# Patient Record
Sex: MALE | Race: WHITE | NOT HISPANIC OR LATINO | Employment: OTHER | URBAN - NONMETROPOLITAN AREA
[De-identification: names, ages, dates, MRNs, and addresses within clinical notes are randomized per-mention and may not be internally consistent; named-entity substitution may affect disease eponyms.]

---

## 2019-10-30 ENCOUNTER — TRANSCRIBE ORDERS (OUTPATIENT)
Dept: GENERAL RADIOLOGY | Facility: HOSPITAL | Age: 49
End: 2019-10-30

## 2019-10-30 ENCOUNTER — HOSPITAL ENCOUNTER (OUTPATIENT)
Dept: GENERAL RADIOLOGY | Facility: HOSPITAL | Age: 49
Discharge: HOME | End: 2019-10-30
Admitting: NURSE PRACTITIONER

## 2019-10-30 DIAGNOSIS — M25.562 LEFT KNEE PAIN, UNSPECIFIED CHRONICITY: Primary | ICD-10-CM

## 2019-10-30 DIAGNOSIS — M25.562 LEFT KNEE PAIN, UNSPECIFIED CHRONICITY: ICD-10-CM

## 2019-10-30 PROCEDURE — 73562 X-RAY EXAM OF KNEE 3: CPT

## 2020-03-06 ENCOUNTER — TRANSCRIBE ORDERS (OUTPATIENT)
Dept: ULTRASOUND IMAGING | Facility: HOSPITAL | Age: 50
End: 2020-03-06

## 2020-03-06 ENCOUNTER — HOSPITAL ENCOUNTER (OUTPATIENT)
Dept: ULTRASOUND IMAGING | Facility: HOSPITAL | Age: 50
Discharge: HOME | End: 2020-03-06
Admitting: NURSE PRACTITIONER

## 2020-03-06 DIAGNOSIS — M79.605 LEFT LEG PAIN: Primary | ICD-10-CM

## 2020-03-06 DIAGNOSIS — M79.605 LEFT LEG PAIN: ICD-10-CM

## 2020-03-06 DIAGNOSIS — R60.9 EDEMA, UNSPECIFIED TYPE: ICD-10-CM

## 2020-03-06 PROCEDURE — 93971 EXTREMITY STUDY: CPT

## 2020-05-21 ENCOUNTER — TRANSCRIBE ORDERS (OUTPATIENT)
Dept: ADMINISTRATIVE | Facility: HOSPITAL | Age: 50
End: 2020-05-21

## 2020-05-21 DIAGNOSIS — N18.2 CHRONIC KIDNEY DISEASE, STAGE II (MILD): Primary | ICD-10-CM

## 2020-06-03 ENCOUNTER — APPOINTMENT (OUTPATIENT)
Dept: CT IMAGING | Facility: HOSPITAL | Age: 50
End: 2020-06-03

## 2020-06-03 ENCOUNTER — HOSPITAL ENCOUNTER (EMERGENCY)
Facility: HOSPITAL | Age: 50
Discharge: ANOTHER HOSPITAL | End: 2020-06-04
Attending: STUDENT IN AN ORGANIZED HEALTH CARE EDUCATION/TRAINING PROGRAM | Admitting: STUDENT IN AN ORGANIZED HEALTH CARE EDUCATION/TRAINING PROGRAM

## 2020-06-03 VITALS
OXYGEN SATURATION: 97 % | BODY MASS INDEX: 42.66 KG/M2 | HEART RATE: 74 BPM | HEIGHT: 72 IN | SYSTOLIC BLOOD PRESSURE: 159 MMHG | DIASTOLIC BLOOD PRESSURE: 78 MMHG | WEIGHT: 315 LBS | RESPIRATION RATE: 20 BRPM | TEMPERATURE: 98.3 F

## 2020-06-03 DIAGNOSIS — R29.898 TRANSIENT WEAKNESS OF LOWER EXTREMITY: Primary | ICD-10-CM

## 2020-06-03 DIAGNOSIS — R20.2 PARESTHESIA OF LOWER EXTREMITY: ICD-10-CM

## 2020-06-03 LAB
ALBUMIN SERPL-MCNC: 3.8 G/DL (ref 3.5–5.2)
ALBUMIN/GLOB SERPL: 1.3 G/DL
ALP SERPL-CCNC: 74 U/L (ref 39–117)
ALT SERPL W P-5'-P-CCNC: 29 U/L (ref 1–41)
ANION GAP SERPL CALCULATED.3IONS-SCNC: 13.1 MMOL/L (ref 5–15)
AST SERPL-CCNC: 24 U/L (ref 1–40)
BASOPHILS # BLD AUTO: 0.02 10*3/MM3 (ref 0–0.2)
BASOPHILS NFR BLD AUTO: 0.2 % (ref 0–1.5)
BILIRUB SERPL-MCNC: 0.6 MG/DL (ref 0.2–1.2)
BUN BLD-MCNC: 18 MG/DL (ref 6–20)
BUN/CREAT SERPL: 15.3 (ref 7–25)
CALCIUM SPEC-SCNC: 9.2 MG/DL (ref 8.6–10.5)
CHLORIDE SERPL-SCNC: 96 MMOL/L (ref 98–107)
CO2 SERPL-SCNC: 28.9 MMOL/L (ref 22–29)
CREAT BLD-MCNC: 1.18 MG/DL (ref 0.76–1.27)
DEPRECATED RDW RBC AUTO: 42.3 FL (ref 37–54)
EOSINOPHIL # BLD AUTO: 0.07 10*3/MM3 (ref 0–0.4)
EOSINOPHIL NFR BLD AUTO: 0.8 % (ref 0.3–6.2)
ERYTHROCYTE [DISTWIDTH] IN BLOOD BY AUTOMATED COUNT: 13.6 % (ref 12.3–15.4)
GFR SERPL CREATININE-BSD FRML MDRD: 65 ML/MIN/1.73
GLOBULIN UR ELPH-MCNC: 2.9 GM/DL
GLUCOSE BLD-MCNC: 103 MG/DL (ref 65–99)
HCT VFR BLD AUTO: 41.9 % (ref 37.5–51)
HGB BLD-MCNC: 14 G/DL (ref 13–17.7)
IMM GRANULOCYTES # BLD AUTO: 0.08 10*3/MM3 (ref 0–0.05)
IMM GRANULOCYTES NFR BLD AUTO: 0.9 % (ref 0–0.5)
LYMPHOCYTES # BLD AUTO: 2.27 10*3/MM3 (ref 0.7–3.1)
LYMPHOCYTES NFR BLD AUTO: 25.9 % (ref 19.6–45.3)
MCH RBC QN AUTO: 28.5 PG (ref 26.6–33)
MCHC RBC AUTO-ENTMCNC: 33.4 G/DL (ref 31.5–35.7)
MCV RBC AUTO: 85.3 FL (ref 79–97)
MONOCYTES # BLD AUTO: 0.66 10*3/MM3 (ref 0.1–0.9)
MONOCYTES NFR BLD AUTO: 7.5 % (ref 5–12)
NEUTROPHILS # BLD AUTO: 5.67 10*3/MM3 (ref 1.7–7)
NEUTROPHILS NFR BLD AUTO: 64.7 % (ref 42.7–76)
NRBC BLD AUTO-RTO: 0 /100 WBC (ref 0–0.2)
PLATELET # BLD AUTO: 219 10*3/MM3 (ref 140–450)
PMV BLD AUTO: 10 FL (ref 6–12)
POTASSIUM BLD-SCNC: 3.6 MMOL/L (ref 3.5–5.2)
PROT SERPL-MCNC: 6.7 G/DL (ref 6–8.5)
RBC # BLD AUTO: 4.91 10*6/MM3 (ref 4.14–5.8)
SODIUM BLD-SCNC: 138 MMOL/L (ref 136–145)
WBC NRBC COR # BLD: 8.77 10*3/MM3 (ref 3.4–10.8)

## 2020-06-03 PROCEDURE — 72131 CT LUMBAR SPINE W/O DYE: CPT

## 2020-06-03 PROCEDURE — 99283 EMERGENCY DEPT VISIT LOW MDM: CPT

## 2020-06-03 PROCEDURE — 85025 COMPLETE CBC W/AUTO DIFF WBC: CPT | Performed by: STUDENT IN AN ORGANIZED HEALTH CARE EDUCATION/TRAINING PROGRAM

## 2020-06-03 PROCEDURE — 81003 URINALYSIS AUTO W/O SCOPE: CPT | Performed by: STUDENT IN AN ORGANIZED HEALTH CARE EDUCATION/TRAINING PROGRAM

## 2020-06-03 PROCEDURE — 80053 COMPREHEN METABOLIC PANEL: CPT | Performed by: STUDENT IN AN ORGANIZED HEALTH CARE EDUCATION/TRAINING PROGRAM

## 2020-06-03 PROCEDURE — 80306 DRUG TEST PRSMV INSTRMNT: CPT | Performed by: STUDENT IN AN ORGANIZED HEALTH CARE EDUCATION/TRAINING PROGRAM

## 2020-06-03 RX ORDER — METOPROLOL SUCCINATE 25 MG/1
25 TABLET, EXTENDED RELEASE ORAL DAILY
COMMUNITY

## 2020-06-03 RX ORDER — LISINOPRIL 20 MG/1
20 TABLET ORAL DAILY
COMMUNITY

## 2020-06-03 RX ORDER — PROPAFENONE HYDROCHLORIDE 150 MG/1
150 TABLET, COATED ORAL EVERY 8 HOURS
COMMUNITY

## 2020-06-04 LAB
AMPHET+METHAMPHET UR QL: NEGATIVE
AMPHETAMINES UR QL: NEGATIVE
BARBITURATES UR QL SCN: NEGATIVE
BENZODIAZ UR QL SCN: NEGATIVE
BILIRUB UR QL STRIP: NEGATIVE
BUPRENORPHINE SERPL-MCNC: NEGATIVE NG/ML
CANNABINOIDS SERPL QL: NEGATIVE
CLARITY UR: CLEAR
COCAINE UR QL: NEGATIVE
COLOR UR: YELLOW
GLUCOSE UR STRIP-MCNC: NEGATIVE MG/DL
HGB UR QL STRIP.AUTO: NEGATIVE
KETONES UR QL STRIP: NEGATIVE
LEUKOCYTE ESTERASE UR QL STRIP.AUTO: NEGATIVE
METHADONE UR QL SCN: NEGATIVE
NITRITE UR QL STRIP: NEGATIVE
OPIATES UR QL: NEGATIVE
OXYCODONE UR QL SCN: NEGATIVE
PCP UR QL SCN: NEGATIVE
PH UR STRIP.AUTO: <=5 [PH] (ref 5–8)
PROPOXYPH UR QL: NEGATIVE
PROT UR QL STRIP: NEGATIVE
SP GR UR STRIP: 1.01 (ref 1–1.03)
TRICYCLICS UR QL SCN: NEGATIVE
UROBILINOGEN UR QL STRIP: NORMAL

## 2020-06-04 NOTE — ED PROVIDER NOTES
Subjective   50-year-old male that presents to the emergency department complaining of numbness and tingling to bilateral lower extremities.  States this is a chronic problem he has had for greater than 2 years but today it is worse than normal.  He states that normally when he wakes up in the morning his legs are not as swollen but as the day goes on his legs swell and become more numb.  It is worse in his feet and slowly gets better as it worked its way up to his thighs.  Patient states that he feels weak as well and feels like his legs will give out on him.  He has had no injury.  Primary care provider did set him up to see a neurologist but the appointment is not for several weeks.  Patient denies fever, chills, sweats, headache, vision changes, chest pain, shortness of breath abdominal pain, nausea, vomiting or diarrhea.  He is not hurting anywhere and is not bleeding.          Review of Systems   All other systems reviewed and are negative.      Past Medical History:   Diagnosis Date   • A-fib (CMS/HCC)    • Hypertension        No Known Allergies    Past Surgical History:   Procedure Laterality Date   • TONSILLECTOMY         History reviewed. No pertinent family history.    Social History     Socioeconomic History   • Marital status:      Spouse name: Not on file   • Number of children: Not on file   • Years of education: Not on file   • Highest education level: Not on file   Tobacco Use   • Smoking status: Never Smoker   Substance and Sexual Activity   • Alcohol use: Not Currently   • Drug use: Not Currently           Objective   Physical Exam   Nursing note and vitals reviewed.      GEN: No acute distress  Head: Normocephalic, atraumatic  Eyes: Pupils equal round reactive to light  ENT: Posterior pharynx normal in appearance, oral mucosa is moist  Chest: Nontender to palpation  Cardiovascular: Regular rate  Lungs: Clear to auscultation bilaterally  Abdomen: Soft, nontender, nondistended, no peritoneal  signs  Extremities: No edema, normal appearance  Neuro: GCS 15, patient does have sensation in bilateral lower extremities to sharp, but does state that his feet feel like they are asleep.  As I work my way up his lower extremities checking for sensation he states that it does improve subjectively above his knees.  Patient has a diminished reflex on the left patella.  Right patellar reflex seems to be intact  Psych: Mood and affect are appropriate      Procedures           ED Course  ED Course as of Jun 04 0021 Wed Jun 03, 2020   2334 CODE BLUE was called and CT.  We got over there it was not an actual code Mr. Lopez had lost control of his legs from the waist down.  Fortunately Rashard, our CT tech, was standing there when this occurred and prevented the patient from falling.  He did ease him to the ground where we placed him on a backboard to get him back up on a gurney.    [DT]   2340 Reexamined Mr. Lopez now that he is lying in a bed in his room again.  He does have some strength against gravity when lifting legs upward.  Still reports numbness that has slightly improved.  He reports that when he sat up after getting the CT scan everything went numb.    [DT]      ED Course User Index  [DT] Salomon Wells MD                                           MDM  Number of Diagnoses or Management Options  Diagnosis management comments: We will perform basic screening laboratories.  Will image patient's lumbar spine with a CT to look for any impingement.  Patient states he does not have any back pain currently.      1216 am -    CT Lumbar Spine Without Contrast (Final result)   Result time 06/03/20 23:58:51   Final result by Harshad Saba MD (06/03/20 23:58:51)             Impression:     Multilevel degenerative change without acute bony abnormality.    Authenticated by Harshad Saba III, MD on 06/03/2020  11:58:51 PM        Narrative:     FINAL REPORT    TECHNIQUE:  Axial imaging of the lumbar spine was  obtained without contrast.  Sagittal and coronal reformatted images were also obtained and  reviewed.  This study was performed with techniques to keep  radiation doses as low as reasonably achievable (ALARA).  Individualized dose reduction techniques using automated  exposure control or adjustment of mA and/or kV according to the  patient''s size were employed.    CLINICAL HISTORY:  bilateral lower extremity numbness    FINDINGS:  There is mild chronic wedging of T11.  No acute fracture is  identified.  The vertebral alignment is normal.  Moderate  diffuse degenerative changes are identified with annular bulges  and osteophytes. There is no evidence of significant central  canal stenosis.     L1-L2: An annular bulge is present.  Facet  arthropathy and osteophytes are present.  There is mild left  neural foraminal narrowing.     L2-L3: An annular bulge and  facet arthropathy are present.  There is mild bilateral neural  foraminal narrowing.     L3-L4: An annular bulge is present.  Facet arthropathy and osteophytes are present.  There is  moderate bilateral neural foraminal narrowing.     L4-L5: An  annular bulge and facet arthropathy are present.  There is  severe bilateral neural foraminal narrowing.     L5-S1: An  annular bulge and facet arthropathy are present.  There is  moderate right and mild left neural foraminal narrowing.          I called [facility] and discussed with Dr. Portillo, neurosurgery, who graciously accepted the patient as a transfer.  Patient will need MRI imaging and possible surgery.      Patient could not move his legs or feel numb at all while in CT.  Since returning to his room this has improved.  He does have some movement against gravity bilaterally with the right being stronger than the left.  He still has a diminished patellar reflex on the left.  He has not lost control of bowel or bladder.  He has not had issues urinating and was able to use a urinal while lying in the bed.    35 minutes  critical care time was spent by the attending physician excluding separately billable procedures         Amount and/or Complexity of Data Reviewed  Clinical lab tests: ordered  Tests in the radiology section of CPT®: reviewed  Discussion of test results with the performing providers: yes  Decide to obtain previous medical records or to obtain history from someone other than the patient: yes  Obtain history from someone other than the patient: yes  Review and summarize past medical records: yes  Discuss the patient with other providers: yes  Independent visualization of images, tracings, or specimens: yes        Final diagnoses:   Transient weakness of lower extremity   Paresthesia of lower extremity            Salomon Wells MD  06/04/20 0021

## 2021-09-07 ENCOUNTER — TRANSCRIBE ORDERS (OUTPATIENT)
Dept: LAB | Facility: HOSPITAL | Age: 51
End: 2021-09-07

## 2021-09-07 ENCOUNTER — LAB (OUTPATIENT)
Dept: LAB | Facility: HOSPITAL | Age: 51
End: 2021-09-07

## 2021-09-07 DIAGNOSIS — Z20.822 COVID-19 RULED OUT: Primary | ICD-10-CM

## 2021-09-07 DIAGNOSIS — Z20.822 COVID-19 RULED OUT: ICD-10-CM

## 2021-09-07 PROCEDURE — C9803 HOPD COVID-19 SPEC COLLECT: HCPCS

## 2021-09-07 PROCEDURE — U0004 COV-19 TEST NON-CDC HGH THRU: HCPCS

## 2021-09-08 LAB — SARS-COV-2 RNA NOSE QL NAA+PROBE: DETECTED

## 2021-09-09 ENCOUNTER — TELEPHONE (OUTPATIENT)
Dept: OTHER | Facility: HOSPITAL | Age: 51
End: 2021-09-09

## 2022-08-31 ENCOUNTER — TRANSCRIBE ORDERS (OUTPATIENT)
Dept: ADMINISTRATIVE | Facility: HOSPITAL | Age: 52
End: 2022-08-31

## 2022-08-31 ENCOUNTER — HOSPITAL ENCOUNTER (OUTPATIENT)
Dept: ULTRASOUND IMAGING | Facility: HOSPITAL | Age: 52
Discharge: HOME | End: 2022-08-31
Admitting: NURSE PRACTITIONER

## 2022-08-31 DIAGNOSIS — M79.604 LEG PAIN, RIGHT: ICD-10-CM

## 2022-08-31 DIAGNOSIS — M79.604 LEG PAIN, RIGHT: Primary | ICD-10-CM

## 2022-08-31 PROCEDURE — 93971 EXTREMITY STUDY: CPT

## 2024-01-15 ENCOUNTER — HOSPITAL ENCOUNTER (EMERGENCY)
Facility: HOSPITAL | Age: 54
Discharge: HOME | End: 2024-01-15
Attending: EMERGENCY MEDICINE | Admitting: STUDENT IN AN ORGANIZED HEALTH CARE EDUCATION/TRAINING PROGRAM
Payer: COMMERCIAL

## 2024-01-15 ENCOUNTER — APPOINTMENT (OUTPATIENT)
Dept: GENERAL RADIOLOGY | Facility: HOSPITAL | Age: 54
End: 2024-01-15
Payer: COMMERCIAL

## 2024-01-15 VITALS
RESPIRATION RATE: 18 BRPM | DIASTOLIC BLOOD PRESSURE: 97 MMHG | HEART RATE: 100 BPM | SYSTOLIC BLOOD PRESSURE: 143 MMHG | OXYGEN SATURATION: 94 % | TEMPERATURE: 98.3 F | HEIGHT: 72 IN | BODY MASS INDEX: 40.23 KG/M2 | WEIGHT: 297 LBS

## 2024-01-15 DIAGNOSIS — M25.472 BILATERAL SWELLING OF FEET AND ANKLES: Primary | ICD-10-CM

## 2024-01-15 DIAGNOSIS — M25.471 BILATERAL SWELLING OF FEET AND ANKLES: Primary | ICD-10-CM

## 2024-01-15 DIAGNOSIS — M25.475 BILATERAL SWELLING OF FEET AND ANKLES: Primary | ICD-10-CM

## 2024-01-15 DIAGNOSIS — M25.474 BILATERAL SWELLING OF FEET AND ANKLES: Primary | ICD-10-CM

## 2024-01-15 LAB
ALBUMIN SERPL-MCNC: 3.7 G/DL (ref 3.5–5.2)
ALBUMIN/GLOB SERPL: 1.4 G/DL
ALP SERPL-CCNC: 73 U/L (ref 39–117)
ALT SERPL W P-5'-P-CCNC: 10 U/L (ref 1–41)
ANION GAP SERPL CALCULATED.3IONS-SCNC: 10.3 MMOL/L (ref 5–15)
AST SERPL-CCNC: 12 U/L (ref 1–40)
BASOPHILS # BLD AUTO: 0.02 10*3/MM3 (ref 0–0.2)
BASOPHILS NFR BLD AUTO: 0.2 % (ref 0–1.5)
BILIRUB SERPL-MCNC: 0.7 MG/DL (ref 0–1.2)
BUN SERPL-MCNC: 11 MG/DL (ref 6–20)
BUN/CREAT SERPL: 9 (ref 7–25)
CALCIUM SPEC-SCNC: 8.8 MG/DL (ref 8.6–10.5)
CHLORIDE SERPL-SCNC: 100 MMOL/L (ref 98–107)
CK SERPL-CCNC: 66 U/L (ref 20–200)
CO2 SERPL-SCNC: 31.7 MMOL/L (ref 22–29)
CREAT SERPL-MCNC: 1.22 MG/DL (ref 0.76–1.27)
CRP SERPL-MCNC: 7.27 MG/DL (ref 0–0.5)
DEPRECATED RDW RBC AUTO: 45.3 FL (ref 37–54)
EGFRCR SERPLBLD CKD-EPI 2021: 70 ML/MIN/1.73
EOSINOPHIL # BLD AUTO: 0.1 10*3/MM3 (ref 0–0.4)
EOSINOPHIL NFR BLD AUTO: 1.2 % (ref 0.3–6.2)
ERYTHROCYTE [DISTWIDTH] IN BLOOD BY AUTOMATED COUNT: 15.1 % (ref 12.3–15.4)
GLOBULIN UR ELPH-MCNC: 2.7 GM/DL
GLUCOSE SERPL-MCNC: 116 MG/DL (ref 65–99)
HCT VFR BLD AUTO: 44.2 % (ref 37.5–51)
HGB BLD-MCNC: 14.3 G/DL (ref 13–17.7)
HOLD SPECIMEN: NORMAL
HOLD SPECIMEN: NORMAL
IMM GRANULOCYTES # BLD AUTO: 0.06 10*3/MM3 (ref 0–0.05)
IMM GRANULOCYTES NFR BLD AUTO: 0.7 % (ref 0–0.5)
LYMPHOCYTES # BLD AUTO: 1.52 10*3/MM3 (ref 0.7–3.1)
LYMPHOCYTES NFR BLD AUTO: 17.5 % (ref 19.6–45.3)
MAGNESIUM SERPL-MCNC: 1.9 MG/DL (ref 1.6–2.6)
MCH RBC QN AUTO: 26.6 PG (ref 26.6–33)
MCHC RBC AUTO-ENTMCNC: 32.4 G/DL (ref 31.5–35.7)
MCV RBC AUTO: 82.2 FL (ref 79–97)
MONOCYTES # BLD AUTO: 0.76 10*3/MM3 (ref 0.1–0.9)
MONOCYTES NFR BLD AUTO: 8.7 % (ref 5–12)
MYOGLOBIN SERPL-MCNC: 31.9 NG/ML (ref 28–72)
NEUTROPHILS NFR BLD AUTO: 6.23 10*3/MM3 (ref 1.7–7)
NEUTROPHILS NFR BLD AUTO: 71.7 % (ref 42.7–76)
NRBC BLD AUTO-RTO: 0 /100 WBC (ref 0–0.2)
NT-PROBNP SERPL-MCNC: 1701 PG/ML (ref 0–900)
PLATELET # BLD AUTO: 275 10*3/MM3 (ref 140–450)
PMV BLD AUTO: 9.3 FL (ref 6–12)
POTASSIUM SERPL-SCNC: 4.4 MMOL/L (ref 3.5–5.2)
PROT SERPL-MCNC: 6.4 G/DL (ref 6–8.5)
RBC # BLD AUTO: 5.38 10*6/MM3 (ref 4.14–5.8)
SODIUM SERPL-SCNC: 142 MMOL/L (ref 136–145)
URATE SERPL-MCNC: 5 MG/DL (ref 3.4–7)
WBC NRBC COR # BLD AUTO: 8.69 10*3/MM3 (ref 3.4–10.8)
WHOLE BLOOD HOLD COAG: NORMAL
WHOLE BLOOD HOLD SPECIMEN: NORMAL

## 2024-01-15 PROCEDURE — 83735 ASSAY OF MAGNESIUM: CPT

## 2024-01-15 PROCEDURE — 82550 ASSAY OF CK (CPK): CPT

## 2024-01-15 PROCEDURE — 73630 X-RAY EXAM OF FOOT: CPT

## 2024-01-15 PROCEDURE — 25010000002 KETOROLAC TROMETHAMINE PER 15 MG

## 2024-01-15 PROCEDURE — 93005 ELECTROCARDIOGRAM TRACING: CPT

## 2024-01-15 PROCEDURE — 86038 ANTINUCLEAR ANTIBODIES: CPT

## 2024-01-15 PROCEDURE — 84550 ASSAY OF BLOOD/URIC ACID: CPT

## 2024-01-15 PROCEDURE — 83880 ASSAY OF NATRIURETIC PEPTIDE: CPT

## 2024-01-15 PROCEDURE — 25010000002 METHYLPREDNISOLONE PER 125 MG

## 2024-01-15 PROCEDURE — 96374 THER/PROPH/DIAG INJ IV PUSH: CPT

## 2024-01-15 PROCEDURE — 99284 EMERGENCY DEPT VISIT MOD MDM: CPT

## 2024-01-15 PROCEDURE — 96375 TX/PRO/DX INJ NEW DRUG ADDON: CPT

## 2024-01-15 PROCEDURE — 73610 X-RAY EXAM OF ANKLE: CPT

## 2024-01-15 PROCEDURE — 80053 COMPREHEN METABOLIC PANEL: CPT

## 2024-01-15 PROCEDURE — 83874 ASSAY OF MYOGLOBIN: CPT

## 2024-01-15 PROCEDURE — 86140 C-REACTIVE PROTEIN: CPT

## 2024-01-15 PROCEDURE — 36415 COLL VENOUS BLD VENIPUNCTURE: CPT

## 2024-01-15 PROCEDURE — 85025 COMPLETE CBC W/AUTO DIFF WBC: CPT

## 2024-01-15 RX ORDER — COLCHICINE 0.6 MG/1
1.2 TABLET ORAL ONCE
Status: COMPLETED | OUTPATIENT
Start: 2024-01-15 | End: 2024-01-15

## 2024-01-15 RX ORDER — KETOROLAC TROMETHAMINE 30 MG/ML
15 INJECTION, SOLUTION INTRAMUSCULAR; INTRAVENOUS ONCE
Status: COMPLETED | OUTPATIENT
Start: 2024-01-15 | End: 2024-01-15

## 2024-01-15 RX ORDER — METHYLPREDNISOLONE SODIUM SUCCINATE 125 MG/2ML
125 INJECTION, POWDER, LYOPHILIZED, FOR SOLUTION INTRAMUSCULAR; INTRAVENOUS ONCE
Status: COMPLETED | OUTPATIENT
Start: 2024-01-15 | End: 2024-01-15

## 2024-01-15 RX ORDER — SODIUM CHLORIDE 0.9 % (FLUSH) 0.9 %
10 SYRINGE (ML) INJECTION AS NEEDED
Status: DISCONTINUED | OUTPATIENT
Start: 2024-01-15 | End: 2024-01-15 | Stop reason: HOSPADM

## 2024-01-15 RX ORDER — DEXAMETHASONE 0.5 MG/1
TABLET ORAL
Qty: 21 TABLET | Refills: 0 | Status: SHIPPED | OUTPATIENT
Start: 2024-01-15

## 2024-01-15 RX ADMIN — KETOROLAC TROMETHAMINE 15 MG: 30 INJECTION, SOLUTION INTRAMUSCULAR; INTRAVENOUS at 20:31

## 2024-01-15 RX ADMIN — COLCHICINE 1.2 MG: 0.6 TABLET ORAL at 20:32

## 2024-01-15 RX ADMIN — METHYLPREDNISOLONE SODIUM SUCCINATE 125 MG: 125 INJECTION, POWDER, FOR SOLUTION INTRAMUSCULAR; INTRAVENOUS at 20:31

## 2024-01-15 NOTE — ED PROVIDER NOTES
Subjective:  History of Present Illness:    Patient is a 53-year-old male here today for bilateral foot and ankle pain and swelling.  He is accompanied by his wife.  Patient reports a long history of gout for which she takes allopurinol 100 mg daily.  He states over the last 5 weeks he was taken off of his lisinopril, meloxicam, and Lasix due to his elevated creatinine by his nephrologist.  Concern for a gout flare, the allopurinol was increased to 200 mg and he was treated with a weeks worth of colchicine and prednisone.  This did not show much improvement and the prednisone was changed to methylprednisolone.  He still did not have any improvement in his pain to his feet.  Since the discontinuation and change to his medications, the patient has noted random, intermittent paresthesias that are painful to his upper and lower extremities.  Some are worse with movement.  He is also noted that his feet and ankles are swelling and becoming more painful.  Over the past couple days they have noted that his right third toe is becoming reddened.  In his experience, he does not describe this as a gout flare, it feels different.  He had labs recently collected and his creatinine has improved.  He has a rheumatology appointment at the end of February.      Nurses Notes reviewed and agree, including vitals, allergies, social history and prior medical history.     REVIEW OF SYSTEMS: All systems reviewed and not pertinent unless noted.  Review of Systems    Past Medical History:   Diagnosis Date    A-fib     Hypertension        Allergies:    Patient has no known allergies.      Past Surgical History:   Procedure Laterality Date    TONSILLECTOMY           Social History     Socioeconomic History    Marital status:    Tobacco Use    Smoking status: Never   Substance and Sexual Activity    Alcohol use: Not Currently    Drug use: Not Currently         History reviewed. No pertinent family history.    Objective  Physical  "Exam:  BP (!) 131/107   Pulse 96   Temp 98.3 °F (36.8 °C) (Oral)   Resp 18   Ht 182.9 cm (72\")   Wt 135 kg (297 lb)   SpO2 97%   BMI 40.28 kg/m²      Physical Exam  Vitals and nursing note reviewed.   Constitutional:       Appearance: Normal appearance. He is normal weight.   HENT:      Head: Normocephalic and atraumatic.   Cardiovascular:      Rate and Rhythm: Normal rate and regular rhythm.      Pulses: Normal pulses.      Heart sounds: Normal heart sounds.      Comments: Generalized edema noted to bilateral ankles and feet with exclusion of toes.  Pulmonary:      Effort: Pulmonary effort is normal.      Breath sounds: Normal breath sounds.   Abdominal:      General: Abdomen is flat. Bowel sounds are normal. There is no distension.      Palpations: Abdomen is soft.      Tenderness: There is no abdominal tenderness.   Musculoskeletal:      Right lower leg: Edema present.      Left lower leg: Edema present.        Feet:    Skin:     General: Skin is warm and dry.      Capillary Refill: Capillary refill takes less than 2 seconds.   Neurological:      General: No focal deficit present.      Mental Status: He is alert and oriented to person, place, and time.   Psychiatric:         Mood and Affect: Mood normal.         Behavior: Behavior normal.         Procedures    ED Course:    ED Course as of 01/15/24 2138   Mon Nicho 15, 2024   1919 Patient EKG interpreted by me, atrial fibrillation with no concerning ST changes noted, rate of 97 [JE]      ED Course User Index  [JE] Dav Kerr MD       Lab Results (last 24 hours)       Procedure Component Value Units Date/Time    CBC & Differential [[id number]]  (Abnormal) Collected: 01/15/24 1856    Specimen: Blood Updated: 01/15/24 1902    Narrative:      The following orders were created for panel order CBC & Differential.  Procedure                               Abnormality         Status                     ---------                               -----------      "    ------                     CBC Auto Differential[[id number]]        Abnormal            Final result                 Please view results for these tests on the individual orders.    Comprehensive Metabolic Panel [[id number]]  (Abnormal) Collected: 01/15/24 1856    Specimen: Blood Updated: 01/15/24 1925     Glucose 116 mg/dL      BUN 11 mg/dL      Creatinine 1.22 mg/dL      Sodium 142 mmol/L      Potassium 4.4 mmol/L      Chloride 100 mmol/L      CO2 31.7 mmol/L      Calcium 8.8 mg/dL      Total Protein 6.4 g/dL      Albumin 3.7 g/dL      ALT (SGPT) 10 U/L      AST (SGOT) 12 U/L      Alkaline Phosphatase 73 U/L      Total Bilirubin 0.7 mg/dL      Globulin 2.7 gm/dL      A/G Ratio 1.4 g/dL      BUN/Creatinine Ratio 9.0     Anion Gap 10.3 mmol/L      eGFR 70.0 mL/min/1.73     Narrative:      GFR Normal >60  Chronic Kidney Disease <60  Kidney Failure <15      C-reactive Protein [[id number]]  (Abnormal) Collected: 01/15/24 1856    Specimen: Blood Updated: 01/15/24 1925     C-Reactive Protein 7.27 mg/dL     CK [[id number]]  (Normal) Collected: 01/15/24 1856    Specimen: Blood Updated: 01/15/24 1925     Creatine Kinase 66 U/L     Magnesium [[id number]]  (Normal) Collected: 01/15/24 1856    Specimen: Blood Updated: 01/15/24 1925     Magnesium 1.9 mg/dL     Myoglobin, Serum [[id number]]  (Normal) Collected: 01/15/24 1856    Specimen: Blood Updated: 01/15/24 1928     Myoglobin 31.9 ng/mL     Narrative:      Results may be falsely decreased if patient taking Biotin.      BNP [[id number]]  (Abnormal) Collected: 01/15/24 1856    Specimen: Blood Updated: 01/15/24 1933     proBNP 1,701.0 pg/mL     Narrative:      This assay is used as an aid in the diagnosis of individuals suspected of having heart failure. It can be used as an aid in the diagnosis of acute decompensated heart failure (ADHF) in patients presenting with signs and symptoms of ADHF to the emergency department (ED). In addition, NT-proBNP of <300 pg/mL indicates ADHF is  not likely.    Age Range Result Interpretation  NT-proBNP Concentration (pg/mL:      <50             Positive            >450                   Gray                 300-450                    Negative             <300    50-75           Positive            >900                  Gray                300-900                  Negative            <300      >75             Positive            >1800                  Gray                300-1800                  Negative            <300    Uric Acid [[id number]]  (Normal) Collected: 01/15/24 1856    Specimen: Blood Updated: 01/15/24 1925     Uric Acid 5.0 mg/dL     CBC Auto Differential [[id number]]  (Abnormal) Collected: 01/15/24 1856    Specimen: Blood Updated: 01/15/24 1902     WBC 8.69 10*3/mm3      RBC 5.38 10*6/mm3      Hemoglobin 14.3 g/dL      Hematocrit 44.2 %      MCV 82.2 fL      MCH 26.6 pg      MCHC 32.4 g/dL      RDW 15.1 %      RDW-SD 45.3 fl      MPV 9.3 fL      Platelets 275 10*3/mm3      Neutrophil % 71.7 %      Lymphocyte % 17.5 %      Monocyte % 8.7 %      Eosinophil % 1.2 %      Basophil % 0.2 %      Immature Grans % 0.7 %      Neutrophils, Absolute 6.23 10*3/mm3      Lymphocytes, Absolute 1.52 10*3/mm3      Monocytes, Absolute 0.76 10*3/mm3      Eosinophils, Absolute 0.10 10*3/mm3      Basophils, Absolute 0.02 10*3/mm3      Immature Grans, Absolute 0.06 10*3/mm3      nRBC 0.0 /100 WBC     NATHANIEL Direct Reflex to 11 [employer]arker [[id number]] Collected: 01/15/24 1856    Specimen: Blood Updated: 01/15/24 2121             No radiology results from the last 24 hrs       MDM     Amount and/or Complexity of Data Reviewed  Clinical lab tests: reviewed  Tests in the medicine section of CPT®: reviewed        Initial impression of presenting illness: Patient is a 53-year-old male here today with bilateral feet and ankle pain/swelling as well as paresthesias.    DDX: includes but is not limited to: Acute gout flare, soft tissue infection, CHF, electrolyte abnormality,  among others    Patient arrives hypertensive, mild tachycardia with vitals interpreted by myself.  Pulse oximeter showing heart rate fluctuating between  beats per minute    Pertinent features from physical exam: Swelling noted above, as well as redness to his right third toe.    Initial diagnostic plan: CBC, CMP, CK, myoglobin, magnesium, uric acid, BNP, EKG, and x-rays of his bilateral feet and ankles.    Results from initial plan were reviewed and interpreted by me revealing overall benign labs however the CRP is 7.27 and BUN is 1700.  No obvious fractures noted on x-rays, there does appear to be tophi present.  Radiology report still pending.    Diagnostic information from other sources: Medical record    Interventions / Re-evaluation: Patient was given colchicine, Toradol, and Solu-Medrol.  He did not note much improvement in his pain with these medications.    Results/clinical rationale were discussed with with Dr. Kerr and did not recommend restarting antihypertensives at this time, would defer to his nephrologist or primary provider.  Did recommend treating with steroids to see if this would be beneficial.  I had a long discussion with the patient and wife, do suspect potential underlying autoimmune cause.  I added the NATHANIEL with reflex test and advised them that when this returns and potentially has a positive result, he should follow-up with his primary provider in the interim while waiting for his rheumatology appointment.  He was agreeable to trying dexamethasone to see if this is beneficial.  Advised him to return to the ER for any new or worsening symptoms.    Consultations/Discussion of results with other physicians: None    Disposition plan: Discharged home in stable condition  -----        Final diagnoses:   Bilateral swelling of feet and ankles          Jaxon Reddy, APRN  01/15/24 0307

## 2024-01-16 NOTE — DISCHARGE INSTRUCTIONS
Your workup today shows that your electrolytes, kidney, and liver function are normal. Your uric acid level is not elevated. Your elevated BP may be the reason why your BNP is elevated and the most likely reason your ankle and feet are swollen.  Recommend talking with Dr. Mendoza or your cardiologist to discuss restarting your lisinopril.  However, with the concern for a possible underlying autoimmune disorder as the cause of your joint pain and swelling, an NATHANIEL test has been added. When this returns, follow-up with your PCP and keep your appointment with a rheumatologist in February. Try the Dexamethasone to see if this helps with your pain in the interim.

## 2024-01-17 LAB — ANA SER QL: NEGATIVE

## 2024-06-07 ENCOUNTER — HOSPITAL ENCOUNTER (OUTPATIENT)
Dept: GENERAL RADIOLOGY | Facility: HOSPITAL | Age: 54
Discharge: HOME | End: 2024-06-07
Payer: COMMERCIAL

## 2024-06-07 ENCOUNTER — TRANSCRIBE ORDERS (OUTPATIENT)
Dept: GENERAL RADIOLOGY | Facility: HOSPITAL | Age: 54
End: 2024-06-07
Payer: COMMERCIAL

## 2024-06-07 DIAGNOSIS — R60.9 EDEMA, UNSPECIFIED TYPE: Primary | ICD-10-CM

## 2024-06-07 DIAGNOSIS — R60.9 EDEMA, UNSPECIFIED TYPE: ICD-10-CM

## 2024-06-07 PROCEDURE — 71046 X-RAY EXAM CHEST 2 VIEWS: CPT

## 2024-06-17 ENCOUNTER — OFFICE VISIT (OUTPATIENT)
Dept: NEUROLOGY | Facility: CLINIC | Age: 54
End: 2024-06-17
Payer: COMMERCIAL

## 2024-06-17 ENCOUNTER — TELEPHONE (OUTPATIENT)
Dept: NEUROLOGY | Facility: CLINIC | Age: 54
End: 2024-06-17

## 2024-06-17 VITALS
DIASTOLIC BLOOD PRESSURE: 88 MMHG | HEIGHT: 72 IN | SYSTOLIC BLOOD PRESSURE: 136 MMHG | OXYGEN SATURATION: 94 % | HEART RATE: 70 BPM | BODY MASS INDEX: 40.27 KG/M2

## 2024-06-17 DIAGNOSIS — R26.89 BALANCE PROBLEM: ICD-10-CM

## 2024-06-17 DIAGNOSIS — R20.2 PARESTHESIAS: Primary | ICD-10-CM

## 2024-06-17 DIAGNOSIS — R20.8 DYSESTHESIA AFFECTING BOTH SIDES OF BODY: ICD-10-CM

## 2024-06-17 PROCEDURE — 99204 OFFICE O/P NEW MOD 45 MIN: CPT | Performed by: PSYCHIATRY & NEUROLOGY

## 2024-06-17 RX ORDER — ALLOPURINOL 200 MG/1
200 TABLET ORAL DAILY
COMMUNITY

## 2024-06-17 RX ORDER — PREGABALIN 75 MG/1
75 CAPSULE ORAL 2 TIMES DAILY
Qty: 60 CAPSULE | Refills: 4 | Status: SHIPPED | OUTPATIENT
Start: 2024-06-17 | End: 2025-06-17

## 2024-06-17 RX ORDER — FUROSEMIDE 40 MG/1
40 TABLET ORAL AS NEEDED
COMMUNITY

## 2024-06-17 NOTE — TELEPHONE ENCOUNTER
PATIENT BROUGHT IN 3 DISKS WITH MRI OF CERVICAL FROM [facility], IMAGING FROM [facility], AND MRI OF L-SPINE. PLACED IN THE BASKET UP FRONT FOR DR HENRY. PATIENT WOULD LIKE THESE DISKS SENT BACK.

## 2024-06-17 NOTE — PROGRESS NOTES
Subjective:    CC: Neal Lopez is seen today in consultation at the request of SERA Pratt for Numbness       HPI:  Patient is a 54-year-old male with past medical history of atrial fibrillation on Eliquis, status post ablation referred to the clinic for evaluation of tingling, numbness in both feet and left upper arm.  He reports that tingling, numbness started in both his feet about 4 years ago after he underwent emergent surgery at T4-T5 level.  He reports at that time he presented with weakness in both his legs and was found to have myelopathy at T4-T5.  He reports that for the first 6 to 7 months, he did not have any sensation in both his legs waist down.  Slowly, he started getting sensation back but now he is reporting tingling, numbness and electrical shocklike sensation in both his feet.  This has affected his balance as well.  He also reports that he has been experiencing patch of numbness in the left lateral upper arm for the last 6 to 9 months as well.  He also reports some pain and tingling numbness involving the base of the thumb on the right.  He tells me that because of the symptoms, he underwent MRI of cervical spine and lumbar spine recently and was told that he does have moderate arthritis but surgery is not recommended at this time.  I do not have this MRI is for me to review.  MRIs were ordered by orthopedic surgery and he is following up with them regularly.  He also reports swelling in both his feet and ankles.  He denies any weakness per se.  Tolerance of bowel or bladder control.    The following portions of the patient's history were reviewed today and updated as of 06/17/2024  : allergies, social history, and problem list.  This document will be scanned to patient's chart.      Current Outpatient Medications:     Allopurinol 200 MG tablet, Take 200 mg by mouth Daily., Disp: , Rfl:     apixaban (ELIQUIS) 5 MG tablet tablet, Take 1 tablet by mouth 2 (Two) Times a Day., Disp: ,  "Rfl:     furosemide (LASIX) 40 MG tablet, Take 1 tablet by mouth As Needed., Disp: , Rfl:     metoprolol succinate XL (TOPROL-XL) 25 MG 24 hr tablet, Take 1 tablet by mouth Daily., Disp: , Rfl:     lisinopril (PRINIVIL,ZESTRIL) 20 MG tablet, Take 20 mg by mouth Daily. (Patient not taking: Reported on 6/17/2024), Disp: , Rfl:     pregabalin (Lyrica) 75 MG capsule, Take 1 capsule by mouth 2 (Two) Times a Day., Disp: 60 capsule, Rfl: 4   Past Medical History:   Diagnosis Date    A-fib     Hypertension       Past Surgical History:   Procedure Laterality Date    TONSILLECTOMY        No family history on file.   Review of Systems    All other systems reviewed and are negative     Objective:    /88   Pulse 70   Ht 182.9 cm (72.01\")   SpO2 94%   BMI 40.27 kg/m²     Neurology Exam:    General apperance: NAD.     Mental status: Alert, awake and oriented to time place and person.    Language and Speech: No aphasia or dysarthria.    Naming , Repitition and Comprehension:  Can name objects, repeat a sentence and follow commands. Speech is clear and fluent with good repetition, comprehension, and naming.    Cranial Nerves:   CN II: Visual fields are full. Intact. Fundi - Normal, No papillederma, Pupils - BRAD  CN III, IV and VI: Extraocular movements are intact. Normal saccades.   CN V: Facial sensation is intact.   CN VII: Muscles of facial expression reveal no asymmetry. Intact.   CN VIII: Hearing is intact. Whispered voice intact.   CN IX and X: Palate elevates symmetrically. Intact  CN XI: Shoulder shrug is intact.   CN XII: Tongue is midline without evidence of atrophy or fasciculation.     Motor:  Right UE muscle strength 5/5. Normal tone.     Left UE muscle strength 5/5. Normal tone.      Right LE muscle strength5/5. Normal tone.     Left LE muscle strength 5/5. Normal tone.      Sensory: Reduced light touch, vibration and pinprick sensation in left lateral upper arm and bilateral feet.    DTRs: 1+ bilaterally " in upper extremities.  2+ in both lower extremities.    Babinski: Negative bilaterally.    Co-ordination: Normal finger-to-nose, heel to shin B/L.    Rhomberg: Negative.    Gait: Somewhat unstable gait.  Walks slowly.    Cardiovascular: Regular rate and rhythm without murmur, gallop or rub.    Assessment and Plan:  1. Paresthesias  2. Dysesthesia affecting both sides of body  3. Balance problem  4.  History of thoracic myelopathy at T4-T5 status post surgery (2020)  -Patient with long-term history of tingling, numbness and electrical shocklike sensation in both the feet.  Symptoms started soon after he underwent surgery for T4-T5 myelopathy myelopathy.  This has progressively become worse and it has affected his balance.  In addition, he is also reporting tingling, numbness in left lateral upper arm.  He recently underwent MRI of cervical spine and lumbar spine.  I do not have the report or images for me to review personally.  He will be dropping of discs for me to review this personally.  It is very well possible that the symptoms are stemming from cervical and lumbar spondylitic changes.  For now, I will be starting him on Lyrica 75 mg twice daily and see how he does.  Once I review the images, further treatment options will be discussed with him.  He has been following up with orthopedic surgery who ordered the scans and was told that surgery is not required at this point in time.  I may eventually consider doing physical therapy dedicated to balance and gait training as well.  Otherwise, I will see him back in clinic in 3 to 4 months for follow-up.    Addendum: I personally reviewed MRI of cervical spine and lumbar spine.  MRI of cervical spine shows severe spondylitic changes at C3-C4, C4-C5 and C5-C6 levels with severe spinal canal stenosis and possible cord compression at this level.  The image quality is degraded by motion artifact.  MRI of lumbosacral spine showed moderate spondylitic changes at L3-L4,  L4-L5.  I will talk to the patient and he if he agrees then my plan would be to have him seen by one of her neurosurgeons at [facility] for further evaluation and treatment options.    Return in about 3 months (around 9/17/2024).     Emanuel Pablo MD      Note to patient: The 21st Century Cures Act makes medical notes like these available to patients in the interest of transparency. However, be advised this is a medical document. It is intended as peer to peer communication. It is written in medical language and may contain abbreviations or verbiage that are unfamiliar. It may appear blunt or direct. Medical documents are intended to carry relevant information, facts as evident, and the clinical opinion of the physician.

## 2024-06-18 ENCOUNTER — PRIOR AUTHORIZATION (OUTPATIENT)
Dept: NEUROLOGY | Facility: CLINIC | Age: 54
End: 2024-06-18
Payer: COMMERCIAL

## 2024-06-19 ENCOUNTER — TELEPHONE (OUTPATIENT)
Dept: NEUROLOGY | Facility: CLINIC | Age: 54
End: 2024-06-19
Payer: COMMERCIAL

## 2024-06-19 NOTE — TELEPHONE ENCOUNTER
MRI disks from [facility] were reviewed by Dr. Pablo, we spoke verbally on the results. Lumbar spine not as bad as neck, but he would like to get a second opinion on the arthritis in Neal's neck by surgery team. Neal is agreeable to the second opinion and was very appreciative of Dr. Pablo's review.     Otherwise, I updated Neal that I did get receive and submit a PA request for the new Lyrica from Dr. Pablo. Per CMM, denied but no reasoning given, stated they will fax the office with reasoning and I will communicate to Neal at that time.     I will also send off his 3 MRI disks to get uploaded into his chart. Once done they will send back to the clinic and at that time I will mail to Neal. Confirmed his home address.     HITESH Bradshaw

## 2024-06-20 DIAGNOSIS — M47.22 CERVICAL SPONDYLOSIS WITH RADICULOPATHY: Primary | ICD-10-CM

## 2024-06-21 ENCOUNTER — PATIENT ROUNDING (BHMG ONLY) (OUTPATIENT)
Dept: NEUROLOGY | Facility: CLINIC | Age: 54
End: 2024-06-21
Payer: COMMERCIAL

## 2024-07-15 ENCOUNTER — APPOINTMENT (OUTPATIENT)
Dept: CT IMAGING | Facility: HOSPITAL | Age: 54
End: 2024-07-15
Payer: COMMERCIAL

## 2024-07-15 ENCOUNTER — APPOINTMENT (OUTPATIENT)
Dept: GENERAL RADIOLOGY | Facility: HOSPITAL | Age: 54
End: 2024-07-15
Payer: COMMERCIAL

## 2024-07-15 ENCOUNTER — HOSPITAL ENCOUNTER (EMERGENCY)
Facility: HOSPITAL | Age: 54
Discharge: HOME | End: 2024-07-15
Attending: STUDENT IN AN ORGANIZED HEALTH CARE EDUCATION/TRAINING PROGRAM
Payer: COMMERCIAL

## 2024-07-15 VITALS
SYSTOLIC BLOOD PRESSURE: 147 MMHG | HEIGHT: 72 IN | DIASTOLIC BLOOD PRESSURE: 99 MMHG | HEART RATE: 56 BPM | OXYGEN SATURATION: 97 % | RESPIRATION RATE: 23 BRPM | BODY MASS INDEX: 40.23 KG/M2 | TEMPERATURE: 98.6 F | WEIGHT: 297 LBS

## 2024-07-15 DIAGNOSIS — J91.8 PLEURAL EFFUSION ASSOCIATED WITH PULMONARY INFECTION: ICD-10-CM

## 2024-07-15 DIAGNOSIS — R18.8 CIRRHOSIS OF LIVER WITH ASCITES, UNSPECIFIED HEPATIC CIRRHOSIS TYPE: Primary | ICD-10-CM

## 2024-07-15 DIAGNOSIS — J18.9 PLEURAL EFFUSION ASSOCIATED WITH PULMONARY INFECTION: ICD-10-CM

## 2024-07-15 DIAGNOSIS — I31.39 PERICARDIAL EFFUSION: ICD-10-CM

## 2024-07-15 DIAGNOSIS — K74.60 CIRRHOSIS OF LIVER WITH ASCITES, UNSPECIFIED HEPATIC CIRRHOSIS TYPE: Primary | ICD-10-CM

## 2024-07-15 LAB
ALBUMIN SERPL-MCNC: 3.3 G/DL (ref 3.5–5.2)
ALBUMIN/GLOB SERPL: 1 G/DL
ALP SERPL-CCNC: 143 U/L (ref 39–117)
ALT SERPL W P-5'-P-CCNC: 15 U/L (ref 1–41)
ANION GAP SERPL CALCULATED.3IONS-SCNC: 10.6 MMOL/L (ref 5–15)
AST SERPL-CCNC: 24 U/L (ref 1–40)
BACTERIA UR QL AUTO: ABNORMAL /HPF
BASOPHILS # BLD AUTO: 0.03 10*3/MM3 (ref 0–0.2)
BASOPHILS NFR BLD AUTO: 0.4 % (ref 0–1.5)
BILIRUB SERPL-MCNC: 1.1 MG/DL (ref 0–1.2)
BILIRUB UR QL STRIP: NEGATIVE
BUN SERPL-MCNC: 17 MG/DL (ref 6–20)
BUN/CREAT SERPL: 17.2 (ref 7–25)
CALCIUM SPEC-SCNC: 8.6 MG/DL (ref 8.6–10.5)
CHLORIDE SERPL-SCNC: 106 MMOL/L (ref 98–107)
CLARITY UR: CLEAR
CO2 SERPL-SCNC: 27.4 MMOL/L (ref 22–29)
COLOR UR: ABNORMAL
CREAT SERPL-MCNC: 0.99 MG/DL (ref 0.76–1.27)
DEPRECATED RDW RBC AUTO: 47.9 FL (ref 37–54)
EGFRCR SERPLBLD CKD-EPI 2021: 89.4 ML/MIN/1.73
EOSINOPHIL # BLD AUTO: 0.07 10*3/MM3 (ref 0–0.4)
EOSINOPHIL NFR BLD AUTO: 1 % (ref 0.3–6.2)
ERYTHROCYTE [DISTWIDTH] IN BLOOD BY AUTOMATED COUNT: 16.8 % (ref 12.3–15.4)
GLOBULIN UR ELPH-MCNC: 3.4 GM/DL
GLUCOSE SERPL-MCNC: 104 MG/DL (ref 65–99)
GLUCOSE UR STRIP-MCNC: NEGATIVE MG/DL
HCT VFR BLD AUTO: 36.9 % (ref 37.5–51)
HGB BLD-MCNC: 11.6 G/DL (ref 13–17.7)
HGB UR QL STRIP.AUTO: ABNORMAL
HYALINE CASTS UR QL AUTO: ABNORMAL /LPF
IMM GRANULOCYTES # BLD AUTO: 0.03 10*3/MM3 (ref 0–0.05)
IMM GRANULOCYTES NFR BLD AUTO: 0.4 % (ref 0–0.5)
INR PPP: 1.29 (ref 0.9–1.1)
KETONES UR QL STRIP: NEGATIVE
LEUKOCYTE ESTERASE UR QL STRIP.AUTO: NEGATIVE
LYMPHOCYTES # BLD AUTO: 1.26 10*3/MM3 (ref 0.7–3.1)
LYMPHOCYTES NFR BLD AUTO: 18.6 % (ref 19.6–45.3)
MCH RBC QN AUTO: 25.1 PG (ref 26.6–33)
MCHC RBC AUTO-ENTMCNC: 31.4 G/DL (ref 31.5–35.7)
MCV RBC AUTO: 79.7 FL (ref 79–97)
MONOCYTES # BLD AUTO: 0.5 10*3/MM3 (ref 0.1–0.9)
MONOCYTES NFR BLD AUTO: 7.4 % (ref 5–12)
NEUTROPHILS NFR BLD AUTO: 4.9 10*3/MM3 (ref 1.7–7)
NEUTROPHILS NFR BLD AUTO: 72.2 % (ref 42.7–76)
NITRITE UR QL STRIP: NEGATIVE
NRBC BLD AUTO-RTO: 0 /100 WBC (ref 0–0.2)
NT-PROBNP SERPL-MCNC: 1762 PG/ML (ref 0–900)
PH UR STRIP.AUTO: 6 [PH] (ref 5–8)
PLATELET # BLD AUTO: 299 10*3/MM3 (ref 140–450)
PMV BLD AUTO: 10.9 FL (ref 6–12)
POTASSIUM SERPL-SCNC: 3.8 MMOL/L (ref 3.5–5.2)
PROT SERPL-MCNC: 6.7 G/DL (ref 6–8.5)
PROT UR QL STRIP: ABNORMAL
PROTHROMBIN TIME: 16.7 SECONDS (ref 12.3–15.1)
RBC # BLD AUTO: 4.63 10*6/MM3 (ref 4.14–5.8)
RBC # UR STRIP: ABNORMAL /HPF
REF LAB TEST METHOD: ABNORMAL
SODIUM SERPL-SCNC: 144 MMOL/L (ref 136–145)
SP GR UR STRIP: 1.02 (ref 1–1.03)
SQUAMOUS #/AREA URNS HPF: ABNORMAL /HPF
UROBILINOGEN UR QL STRIP: ABNORMAL
WBC # UR STRIP: ABNORMAL /HPF
WBC NRBC COR # BLD AUTO: 6.79 10*3/MM3 (ref 3.4–10.8)

## 2024-07-15 PROCEDURE — 80053 COMPREHEN METABOLIC PANEL: CPT | Performed by: STUDENT IN AN ORGANIZED HEALTH CARE EDUCATION/TRAINING PROGRAM

## 2024-07-15 PROCEDURE — 85025 COMPLETE CBC W/AUTO DIFF WBC: CPT | Performed by: STUDENT IN AN ORGANIZED HEALTH CARE EDUCATION/TRAINING PROGRAM

## 2024-07-15 PROCEDURE — 81001 URINALYSIS AUTO W/SCOPE: CPT | Performed by: STUDENT IN AN ORGANIZED HEALTH CARE EDUCATION/TRAINING PROGRAM

## 2024-07-15 PROCEDURE — 25510000001 IOPAMIDOL 61 % SOLUTION: Performed by: STUDENT IN AN ORGANIZED HEALTH CARE EDUCATION/TRAINING PROGRAM

## 2024-07-15 PROCEDURE — 74177 CT ABD & PELVIS W/CONTRAST: CPT

## 2024-07-15 PROCEDURE — 85610 PROTHROMBIN TIME: CPT | Performed by: NURSE PRACTITIONER

## 2024-07-15 PROCEDURE — 99285 EMERGENCY DEPT VISIT HI MDM: CPT

## 2024-07-15 PROCEDURE — 71045 X-RAY EXAM CHEST 1 VIEW: CPT

## 2024-07-15 PROCEDURE — 83880 ASSAY OF NATRIURETIC PEPTIDE: CPT | Performed by: NURSE PRACTITIONER

## 2024-07-15 PROCEDURE — 93005 ELECTROCARDIOGRAM TRACING: CPT | Performed by: NURSE PRACTITIONER

## 2024-07-15 RX ADMIN — IOPAMIDOL 100 ML: 612 INJECTION, SOLUTION INTRAVENOUS at 12:44

## 2024-07-15 NOTE — DISCHARGE INSTRUCTIONS
Please note on your CT scan there is presence of liver cirrhosis with mild ascites.  There is a mild pericardial effusion.  There is pleural effusion.  Recommend taking furosemide daily versus on as-needed basis.  Please follow-up with gastroenterology

## 2024-07-15 NOTE — ED PROVIDER NOTES
"Subjective:  History of Present Illness:    Patient is a 54-year-old male with history of A-fib and hypertension.  Presents to the ER today with generalized abdominal pain and abdominal fullness.  Reports that he was recently seen in his PCP office and urine was positive for blood.  Has also been having edema in bilateral leg which has been going on for several months.  He denies fever.  Denies cough.  Denies shortness of breath or chest pain.  Denies OTC medication or home remedy.  Denies alleviating exacerbating factors.    Nurses Notes reviewed and agree, including vitals, allergies, social history and prior medical history.     REVIEW OF SYSTEMS: All systems reviewed and not pertinent unless noted.  Review of Systems   Gastrointestinal:  Positive for abdominal distention and abdominal pain.   All other systems reviewed and are negative.      Past Medical History:   Diagnosis Date    A-fib     Hypertension        Allergies:    Patient has no known allergies.      Past Surgical History:   Procedure Laterality Date    TONSILLECTOMY           Social History     Socioeconomic History    Marital status:    Tobacco Use    Smoking status: Never     Passive exposure: Never   Substance and Sexual Activity    Alcohol use: Not Currently    Drug use: Not Currently         History reviewed. No pertinent family history.    Objective  Physical Exam:  /99   Pulse 56   Temp 98.6 °F (37 °C) (Oral)   Resp 23   Ht 182.9 cm (72\")   Wt 135 kg (297 lb)   SpO2 97%   BMI 40.28 kg/m²      Physical Exam  Vitals and nursing note reviewed.   Constitutional:       Appearance: He is well-developed and normal weight.   HENT:      Head: Normocephalic and atraumatic.      Mouth/Throat:      Mouth: Mucous membranes are moist.      Pharynx: Oropharynx is clear.   Eyes:      Extraocular Movements: Extraocular movements intact.      Pupils: Pupils are equal, round, and reactive to light.   Cardiovascular:      Rate and Rhythm: " Normal rate and regular rhythm.   Pulmonary:      Effort: Pulmonary effort is normal.      Breath sounds: Normal breath sounds.   Abdominal:      General: Abdomen is flat.      Palpations: Abdomen is soft.   Skin:     General: Skin is warm and dry.      Capillary Refill: Capillary refill takes less than 2 seconds.   Neurological:      General: No focal deficit present.      Mental Status: He is alert and oriented to person, place, and time.   Psychiatric:         Mood and Affect: Mood normal.         Behavior: Behavior normal.         Procedures    ED Course:         Lab Results (last 24 hours)       Procedure Component Value Units Date/Time    CBC & Differential [[id number]]  (Abnormal) Collected: 07/15/24 1155    Specimen: Blood Updated: 07/15/24 1205    Narrative:      The following orders were created for panel order CBC & Differential.  Procedure                               Abnormality         Status                     ---------                               -----------         ------                     CBC Auto Differential[[id number]]        Abnormal            Final result                 Please view results for these tests on the individual orders.    Comprehensive Metabolic Panel [[id number]]  (Abnormal) Collected: 07/15/24 1155    Specimen: Blood Updated: 07/15/24 1233     Glucose 104 mg/dL      BUN 17 mg/dL      Creatinine 0.99 mg/dL      Sodium 144 mmol/L      Potassium 3.8 mmol/L      Chloride 106 mmol/L      CO2 27.4 mmol/L      Calcium 8.6 mg/dL      Total Protein 6.7 g/dL      Albumin 3.3 g/dL      ALT (SGPT) 15 U/L      AST (SGOT) 24 U/L      Alkaline Phosphatase 143 U/L      Total Bilirubin 1.1 mg/dL      Globulin 3.4 gm/dL      A/G Ratio 1.0 g/dL      BUN/Creatinine Ratio 17.2     Anion Gap 10.6 mmol/L      eGFR 89.4 mL/min/1.73     Narrative:      GFR Normal >60  Chronic Kidney Disease <60  Kidney Failure <15      CBC Auto Differential [[id number]]  (Abnormal) Collected: 07/15/24 1155     Specimen: Blood Updated: 07/15/24 1205     WBC 6.79 10*3/mm3      RBC 4.63 10*6/mm3      Hemoglobin 11.6 g/dL      Hematocrit 36.9 %      MCV 79.7 fL      MCH 25.1 pg      MCHC 31.4 g/dL      RDW 16.8 %      RDW-SD 47.9 fl      MPV 10.9 fL      Platelets 299 10*3/mm3      Neutrophil % 72.2 %      Lymphocyte % 18.6 %      Monocyte % 7.4 %      Eosinophil % 1.0 %      Basophil % 0.4 %      Immature Grans % 0.4 %      Neutrophils, Absolute 4.90 10*3/mm3      Lymphocytes, Absolute 1.26 10*3/mm3      Monocytes, Absolute 0.50 10*3/mm3      Eosinophils, Absolute 0.07 10*3/mm3      Basophils, Absolute 0.03 10*3/mm3      Immature Grans, Absolute 0.03 10*3/mm3      nRBC 0.0 /100 WBC     BNP [[id number]]  (Abnormal) Collected: 07/15/24 1155    Specimen: Blood Updated: 07/15/24 1321     proBNP 1,762.0 pg/mL     Narrative:      This assay is used as an aid in the diagnosis of individuals suspected of having heart failure. It can be used as an aid in the diagnosis of acute decompensated heart failure (ADHF) in patients presenting with signs and symptoms of ADHF to the emergency department (ED). In addition, NT-proBNP of <300 pg/mL indicates ADHF is not likely.    Age Range Result Interpretation  NT-proBNP Concentration (pg/mL:      <50             Positive            >450                   Gray                 300-450                    Negative             <300    50-75           Positive            >900                  Gray                300-900                  Negative            <300      >75             Positive            >1800                  Gray                300-1800                  Negative            <300    Urinalysis With Microscopic If Indicated (No Culture) - Urine, Clean Catch [[id number]]  (Abnormal) Collected: 07/15/24 1218    Specimen: Urine, Clean Catch Updated: 07/15/24 1227     Color, UA Dark Yellow     Appearance, UA Clear     pH, UA 6.0     Specific Gravity, UA 1.021     Glucose, UA Negative      Ketones, UA Negative     Bilirubin, UA Negative     Blood, UA Trace     Protein, UA >=300 mg/dL (3+)     Leuk Esterase, UA Negative     Nitrite, UA Negative     Urobilinogen, UA 1.0 E.U./dL    Urinalysis, Microscopic Only - Urine, Clean Catch [[id number]]  (Abnormal) Collected: 07/15/24 1218    Specimen: Urine, Clean Catch Updated: 07/15/24 1243     RBC, UA 0-2 /HPF      WBC, UA 0-2 /HPF      Bacteria, UA Trace /HPF      Squamous Epithelial Cells, UA 0-2 /HPF      Hyaline Casts, UA 3-6 /LPF      Methodology Manual Light Microscopy    Protime-INR [[id number]]  (Abnormal) Collected: 07/15/24 1314    Specimen: Blood Updated: 07/15/24 1345     Protime 16.7 Seconds      INR 1.29    Narrative:      Suggested INR therapeutic range for stable oral anticoagulant therapy:    Low Intensity therapy:   1.5-2.0  Moderate Intensity therapy:   2.0-3.0  High Intensity therapy:   2.5-4.0             CT Abdomen Pelvis With Contrast    Result Date: 7/15/2024  CT ABDOMEN AND PELVIS WITH CONTRAST  INDICATION: Generalized abdominal pain.  TECHNIQUE: Thin section axial images were obtained from the lung bases through the pubic symphysis after oral and intravenous contrast. Coronal reconstruction images were obtained from the axial data.  COMPARISON: None.  FINDINGS:  Lower chest: There are small bilateral pleural effusions, right greater than left. There is a small high density pericardial effusion. Hemopericardium not excluded. There is right greater than left lower lobe airspace disease favored to be atelectasis. Pneumonia not excluded.  Liver: The liver is nodular in contour, consistent with cirrhosis. There are areas of fatty liver in the right lobe. No focal hepatic lesion.  Gallbladder/biliary system: The gallbladder is contracted. No intrahepatic or extrahepatic biliary dilatation.  Spleen:  Unremarkable.  Adrenal glands:  Unremarkable. No nodules.  Pancreas: No acute pancreatic abnormality. No mass identified.   Kidneys/ureters/bladder:  No hydronephrosis, solid mass or perinephric stranding. No acute abnormality of the urinary bladder.  GI tract: There is no evidence of small bowel obstruction. Mild wall thickening of proximal small bowel loops is nonspecific in the setting of ascites. Normal appendix. No acute colon abnormality.  Pelvic organs: Unremarkable for age.  Lymph nodes/mesentery/retroperitoneum: There are borderline periportal lymph nodes. No retroperitoneal or pelvic lymphadenopathy.  Abdominal wall: There is diffuse body wall anasarca.  Vascular: No abdominal aortic aneurysm.  Free fluid: There is a small amount of abdominal and pelvic ascites.  Bones: No acute osseous abnormality.      Impression: 1. Cirrhosis with a small amount of ascites. 2. Bilateral pleural effusions with lower lobe airspace disease favored to be atelectasis although pneumonia not entirely excluded. 3. Small hyperdense pericardial effusion. Hemopericardium not excluded.      CTDI: 19.68 mGy DLP:1072.13 mGy.cm   This report was signed and finalized on 7/15/2024 1:00 PM by Liana Nuñez MD.      XR Chest 1 View    Result Date: 7/15/2024  PROCEDURE: XR CHEST 1 VW-  INDICATION:  Swelling in the legs  FINDINGS:  A portable view of the chest was obtained.  Comparison is made to a prior exam dated 6/7/2024.   Cardiac and mediastinal silhouettes are normal. There are new bilateral interstitial opacities. Favor pulmonary edema. A small right pleural effusion is not excluded. No pneumothorax.      Impression: New interstitial opacities. Favor pulmonary edema.   This report was signed and finalized on 7/15/2024 12:53 PM by Liana Nuñez MD.          MDM     Amount and/or Complexity of Data Reviewed  Decide to obtain previous medical records or to obtain history from someone other than the patient: yes        Initial impression of presenting illness: Patient is a 54-year-old male with history of A-fib and hypertension.  Presents to the ER today  with generalized abdominal pain and abdominal fullness.  Reports that he was recently seen in his PCP office and urine was positive for blood.  Has also been having edema in bilateral leg which has been going on for several months.  He denies fever.  Denies cough.  Denies shortness of breath or chest pain.  Denies OTC medication or home remedy.  Denies alleviating exacerbating factors.    DDX: includes but is not limited to: Constipation, cirrhosis, CHF, renal failure or other    Patient arrives stable with vitals interpreted by myself.     Pertinent features from physical exam: Lung sounds are clear bilaterally throughout.  Abdo soft nontender.  Bowel sounds are normal.  Heart sounds normal.  There is 2+ pitting edema in bilateral lower extremity..    Initial diagnostic plan: CBC, CMP, urinalysis, proBNP, pro time/INR, EKG, chest x-ray, CT abdomen pelvis with contrast    Results from initial plan were reviewed and interpreted by me revealing CBC is with mild anemia but is baseline for this patient.  CMP is within appropriate range.  proBNP was elevated at 1762.  This baseline for this patient.  Urinalysis was with trace bacteria negative for leukocytes.  Protein and trace blood was also noted.  PT/INR is elevated at 16.7/1.29 EKG is sinus bradycardia rate of 59.  Chest x-ray with following impression new interstitial opacities.  Favor pulmonary edema.  CT abdomen pelvis with the following impression.  #1 cirrhosis with small amount of ascites.  #2 bilateral pleural effusions with lower lobe airspace disease favored to be atelectasis although pneumonia cannot entirely excluded.  Small hyperdense pericardial effusion.  Hemopericardium not excluded    Diagnostic information from other sources: Chart review    Interventions / Re-evaluation: Vital signs stable throughout encounter.    Results/clinical rationale were discussed with patient    Consultations/Discussion of results with other physicians:   [place]    Disposition plan: Patient is hemodynamically stable nontoxic-appearing appropriate discharge.  Patient is currently following with [facility] for pericardial effusion.  Recently had pericarditis.  Will have patient follow-up with gastroenterologist regarding new finding of cirrhosis.  Patient follow-up with PCP within the week.  Follow-up ER for new or worse symptoms.  -----        Final diagnoses:   Cirrhosis of liver with ascites, unspecified hepatic cirrhosis type   Pericardial effusion   Pleural effusion associated with pulmonary infection          Jose Elias Zaldivar, APRN  07/15/24 1704

## 2024-07-23 ENCOUNTER — OFFICE VISIT (OUTPATIENT)
Dept: NEUROSURGERY | Facility: CLINIC | Age: 54
End: 2024-07-23
Payer: COMMERCIAL

## 2024-07-23 VITALS — BODY MASS INDEX: 41.5 KG/M2 | HEIGHT: 72 IN | WEIGHT: 306.4 LBS

## 2024-07-23 DIAGNOSIS — M47.812 CERVICAL ARTHRITIS: ICD-10-CM

## 2024-07-23 DIAGNOSIS — M48.02 SPINAL STENOSIS IN CERVICAL REGION: Primary | ICD-10-CM

## 2024-07-23 PROCEDURE — 99203 OFFICE O/P NEW LOW 30 MIN: CPT | Performed by: NEUROLOGICAL SURGERY

## 2024-09-17 ENCOUNTER — TELEPHONE (OUTPATIENT)
Dept: NEUROLOGY | Facility: CLINIC | Age: 54
End: 2024-09-17

## 2024-09-17 ENCOUNTER — LAB (OUTPATIENT)
Dept: LAB | Facility: HOSPITAL | Age: 54
End: 2024-09-17
Payer: COMMERCIAL

## 2024-09-17 ENCOUNTER — OFFICE VISIT (OUTPATIENT)
Dept: GASTROENTEROLOGY | Facility: CLINIC | Age: 54
End: 2024-09-17
Payer: COMMERCIAL

## 2024-09-17 VITALS
BODY MASS INDEX: 42.66 KG/M2 | HEART RATE: 60 BPM | HEIGHT: 72 IN | DIASTOLIC BLOOD PRESSURE: 100 MMHG | WEIGHT: 315 LBS | SYSTOLIC BLOOD PRESSURE: 145 MMHG | OXYGEN SATURATION: 99 %

## 2024-09-17 DIAGNOSIS — K76.0 FATTY (CHANGE OF) LIVER, NOT ELSEWHERE CLASSIFIED: Chronic | ICD-10-CM

## 2024-09-17 DIAGNOSIS — E66.01 CLASS 3 SEVERE OBESITY DUE TO EXCESS CALORIES WITH SERIOUS COMORBIDITY AND BODY MASS INDEX (BMI) OF 40.0 TO 44.9 IN ADULT: Chronic | ICD-10-CM

## 2024-09-17 DIAGNOSIS — Z12.11 ENCOUNTER FOR SCREENING FOR MALIGNANT NEOPLASM OF COLON: ICD-10-CM

## 2024-09-17 DIAGNOSIS — R14.0 BLOATING: Chronic | ICD-10-CM

## 2024-09-17 DIAGNOSIS — R79.89 ELEVATED LIVER FUNCTION TESTS: Chronic | ICD-10-CM

## 2024-09-17 DIAGNOSIS — K59.00 CONSTIPATION, UNSPECIFIED CONSTIPATION TYPE: Primary | Chronic | ICD-10-CM

## 2024-09-17 DIAGNOSIS — D64.9 ANEMIA, UNSPECIFIED TYPE: Chronic | ICD-10-CM

## 2024-09-17 DIAGNOSIS — K74.60 CIRRHOSIS OF LIVER WITHOUT ASCITES, UNSPECIFIED HEPATIC CIRRHOSIS TYPE: Chronic | ICD-10-CM

## 2024-09-17 PROBLEM — E66.813 CLASS 3 SEVERE OBESITY DUE TO EXCESS CALORIES WITH SERIOUS COMORBIDITY AND BODY MASS INDEX (BMI) OF 40.0 TO 44.9 IN ADULT: Chronic | Status: ACTIVE | Noted: 2024-09-17

## 2024-09-17 LAB
ALBUMIN SERPL-MCNC: 4 G/DL (ref 3.5–5.2)
ALBUMIN/GLOB SERPL: 1.4 G/DL
ALP SERPL-CCNC: 201 U/L (ref 39–117)
ALPHA1 GLOB MFR UR ELPH: 193 MG/DL (ref 90–200)
ALT SERPL W P-5'-P-CCNC: 22 U/L (ref 1–41)
ANION GAP SERPL CALCULATED.3IONS-SCNC: 13.4 MMOL/L (ref 5–15)
AST SERPL-CCNC: 30 U/L (ref 1–40)
BILIRUB SERPL-MCNC: 1.2 MG/DL (ref 0–1.2)
BUN SERPL-MCNC: 20 MG/DL (ref 6–20)
BUN/CREAT SERPL: 13.3 (ref 7–25)
CALCIUM SPEC-SCNC: 9.1 MG/DL (ref 8.6–10.5)
CHLORIDE SERPL-SCNC: 100 MMOL/L (ref 98–107)
CO2 SERPL-SCNC: 29.6 MMOL/L (ref 22–29)
CREAT SERPL-MCNC: 1.5 MG/DL (ref 0.76–1.27)
DEPRECATED RDW RBC AUTO: 47 FL (ref 37–54)
EGFRCR SERPLBLD CKD-EPI 2021: 54.3 ML/MIN/1.73
ERYTHROCYTE [DISTWIDTH] IN BLOOD BY AUTOMATED COUNT: 15.8 % (ref 12.3–15.4)
GLOBULIN UR ELPH-MCNC: 2.9 GM/DL
GLUCOSE SERPL-MCNC: 95 MG/DL (ref 65–99)
HBV SURFACE AB SER RIA-ACNC: NORMAL
HBV SURFACE AG SERPL QL IA: NORMAL
HCT VFR BLD AUTO: 44.4 % (ref 37.5–51)
HCV AB SER QL: NORMAL
HGB BLD-MCNC: 13.5 G/DL (ref 13–17.7)
INR PPP: 1.47 (ref 0.9–1.1)
MCH RBC QN AUTO: 25 PG (ref 26.6–33)
MCHC RBC AUTO-ENTMCNC: 30.4 G/DL (ref 31.5–35.7)
MCV RBC AUTO: 82.4 FL (ref 79–97)
PLATELET # BLD AUTO: 231 10*3/MM3 (ref 140–450)
PMV BLD AUTO: 12 FL (ref 6–12)
POTASSIUM SERPL-SCNC: 3.5 MMOL/L (ref 3.5–5.2)
PROT SERPL-MCNC: 6.9 G/DL (ref 6–8.5)
PROTHROMBIN TIME: 18.4 SECONDS (ref 12.3–15.1)
RBC # BLD AUTO: 5.39 10*6/MM3 (ref 4.14–5.8)
SODIUM SERPL-SCNC: 143 MMOL/L (ref 136–145)
WBC NRBC COR # BLD AUTO: 5.32 10*3/MM3 (ref 3.4–10.8)

## 2024-09-17 PROCEDURE — 80053 COMPREHEN METABOLIC PANEL: CPT

## 2024-09-17 PROCEDURE — 86706 HEP B SURFACE ANTIBODY: CPT

## 2024-09-17 PROCEDURE — 36415 COLL VENOUS BLD VENIPUNCTURE: CPT

## 2024-09-17 PROCEDURE — 86015 ACTIN ANTIBODY EACH: CPT

## 2024-09-17 PROCEDURE — 82977 ASSAY OF GGT: CPT

## 2024-09-17 PROCEDURE — 82465 ASSAY BLD/SERUM CHOLESTEROL: CPT

## 2024-09-17 PROCEDURE — 86708 HEPATITIS A ANTIBODY: CPT

## 2024-09-17 PROCEDURE — 83883 ASSAY NEPHELOMETRY NOT SPEC: CPT

## 2024-09-17 PROCEDURE — 85610 PROTHROMBIN TIME: CPT

## 2024-09-17 PROCEDURE — 83010 ASSAY OF HAPTOGLOBIN QUANT: CPT

## 2024-09-17 PROCEDURE — 86803 HEPATITIS C AB TEST: CPT

## 2024-09-17 PROCEDURE — 82103 ALPHA-1-ANTITRYPSIN TOTAL: CPT

## 2024-09-17 PROCEDURE — 86381 MITOCHONDRIAL ANTIBODY EACH: CPT

## 2024-09-17 PROCEDURE — 86704 HEP B CORE ANTIBODY TOTAL: CPT

## 2024-09-17 PROCEDURE — 87340 HEPATITIS B SURFACE AG IA: CPT

## 2024-09-17 PROCEDURE — 84478 ASSAY OF TRIGLYCERIDES: CPT

## 2024-09-17 PROCEDURE — 85027 COMPLETE CBC AUTOMATED: CPT

## 2024-09-17 PROCEDURE — 82172 ASSAY OF APOLIPOPROTEIN: CPT

## 2024-09-17 RX ORDER — SODIUM CHLORIDE 9 MG/ML
70 INJECTION, SOLUTION INTRAVENOUS CONTINUOUS PRN
OUTPATIENT
Start: 2024-09-17

## 2024-09-17 RX ORDER — BISACODYL 5 MG/1
TABLET, DELAYED RELEASE ORAL
Qty: 4 TABLET | Refills: 0 | Status: SHIPPED | OUTPATIENT
Start: 2024-09-17

## 2024-09-17 RX ORDER — ANASTROZOLE 1 MG/1
1 TABLET ORAL DAILY
COMMUNITY
Start: 2024-09-02

## 2024-09-17 RX ORDER — LISINOPRIL 30 MG/1
1 TABLET ORAL DAILY
COMMUNITY
Start: 2024-08-31

## 2024-09-17 RX ORDER — FLUTICASONE PROPIONATE 50 MCG
1 SPRAY, SUSPENSION (ML) NASAL DAILY
COMMUNITY
Start: 2024-08-05

## 2024-09-17 RX ORDER — ALBUTEROL SULFATE 90 UG/1
AEROSOL, METERED RESPIRATORY (INHALATION)
COMMUNITY
Start: 2024-07-24

## 2024-09-17 RX ORDER — SODIUM, POTASSIUM,MAG SULFATES 17.5-3.13G
SOLUTION, RECONSTITUTED, ORAL ORAL
Qty: 177 ML | Refills: 0 | Status: SHIPPED | OUTPATIENT
Start: 2024-09-17

## 2024-09-17 NOTE — TELEPHONE ENCOUNTER
Caller: PATIENT    Relationship: SELF    Best call back number: [phone]    PATIENT CALLED REQUESTING TO CANCEL SAME DAY APPT.    Did the patient call AFTER the start time of their scheduled appointment?  NO    Was the patient's appointment rescheduled?    
100% of the time

## 2024-09-18 PROBLEM — Z12.11 ENCOUNTER FOR SCREENING FOR MALIGNANT NEOPLASM OF COLON: Status: ACTIVE | Noted: 2024-09-17

## 2024-09-18 LAB
HAV AB SER QL IA: NEGATIVE
HBV CORE AB SERPL QL IA: NEGATIVE
MITOCHONDRIA M2 IGG SER-ACNC: <20 UNITS (ref 0–20)
SMA IGG SER-ACNC: 5 UNITS (ref 0–19)

## 2024-09-19 ENCOUNTER — PATIENT ROUNDING (BHMG ONLY) (OUTPATIENT)
Dept: GASTROENTEROLOGY | Facility: CLINIC | Age: 54
End: 2024-09-19
Payer: COMMERCIAL

## 2024-09-20 LAB
A2 MACROGLOB SERPL-MCNC: 162 MG/DL (ref 110–276)
ALT SERPL W P-5'-P-CCNC: 22 IU/L (ref 0–55)
APO A-I SERPL-MCNC: 95 MG/DL (ref 101–178)
AST SERPL W P-5'-P-CCNC: 29 IU/L (ref 0–40)
BILIRUB SERPL-MCNC: 0.6 MG/DL (ref 0–1.2)
CHOLEST SERPL-MCNC: 117 MG/DL (ref 100–199)
FIBROSIS SCORING:: ABNORMAL
FIBROSIS STAGE SERPL QL: ABNORMAL
GGT SERPL-CCNC: 192 IU/L (ref 0–65)
GLUCOSE SERPL-MCNC: 100 MG/DL (ref 70–99)
HAPTOGLOB SERPL-MCNC: 185 MG/DL (ref 29–370)
LABORATORY COMMENT REPORT: ABNORMAL
LIVER FIBR SCORE SERPL CALC.FIBROSURE: 0.47 (ref 0–0.21)
LIVER STEATOSIS GRADE SERPL QL: ABNORMAL
LIVER STEATOSIS SCORE SERPL: 0.67 (ref 0–0.4)
NASH GRADE SERPL QL: ABNORMAL
NASH INTERPRETATION SERPL-IMP: ABNORMAL
NASH SCORE SERPL: 0.6 (ref 0–0.25)
NASH SCORING: ABNORMAL
STEATOSIS SCORING: ABNORMAL
TEST PERFORMANCE INFO SPEC: ABNORMAL
TEST PERFORMANCE INFO SPEC: ABNORMAL
TRIGL SERPL-MCNC: 82 MG/DL (ref 0–149)

## 2024-10-03 ENCOUNTER — ANESTHESIA EVENT (OUTPATIENT)
Dept: GASTROENTEROLOGY | Facility: HOSPITAL | Age: 54
End: 2024-10-03
Payer: COMMERCIAL

## 2024-10-03 RX ORDER — LEFLUNOMIDE 10 MG/1
10 TABLET ORAL DAILY
COMMUNITY

## 2024-10-07 ENCOUNTER — ANESTHESIA (OUTPATIENT)
Dept: GASTROENTEROLOGY | Facility: HOSPITAL | Age: 54
End: 2024-10-07
Payer: COMMERCIAL

## 2024-10-07 ENCOUNTER — HOSPITAL ENCOUNTER (OUTPATIENT)
Facility: HOSPITAL | Age: 54
Setting detail: HOSPITAL OUTPATIENT SURGERY
Discharge: HOME | End: 2024-10-07
Attending: INTERNAL MEDICINE | Admitting: INTERNAL MEDICINE
Payer: COMMERCIAL

## 2024-10-07 VITALS
HEART RATE: 73 BPM | HEIGHT: 72 IN | WEIGHT: 298 LBS | TEMPERATURE: 97.2 F | SYSTOLIC BLOOD PRESSURE: 126 MMHG | DIASTOLIC BLOOD PRESSURE: 89 MMHG | OXYGEN SATURATION: 95 % | BODY MASS INDEX: 40.36 KG/M2 | RESPIRATION RATE: 16 BRPM

## 2024-10-07 DIAGNOSIS — Z12.11 ENCOUNTER FOR SCREENING FOR MALIGNANT NEOPLASM OF COLON: ICD-10-CM

## 2024-10-07 PROCEDURE — 25010000002 LIDOCAINE (CARDIAC): Performed by: NURSE ANESTHETIST, CERTIFIED REGISTERED

## 2024-10-07 PROCEDURE — 25010000002 PROPOFOL 10 MG/ML EMULSION: Performed by: NURSE ANESTHETIST, CERTIFIED REGISTERED

## 2024-10-07 PROCEDURE — 25810000003 SODIUM CHLORIDE 0.9 % SOLUTION: Performed by: NURSE PRACTITIONER

## 2024-10-07 DEVICE — DEV CLIP ENDO RESOLUTION360 CONTRL ROT 235CM: Type: IMPLANTABLE DEVICE | Site: COLON | Status: FUNCTIONAL

## 2024-10-07 RX ORDER — PROPOFOL 10 MG/ML
VIAL (ML) INTRAVENOUS AS NEEDED
Status: DISCONTINUED | OUTPATIENT
Start: 2024-10-07 | End: 2024-10-07 | Stop reason: SURG

## 2024-10-07 RX ORDER — SODIUM CHLORIDE 9 MG/ML
70 INJECTION, SOLUTION INTRAVENOUS CONTINUOUS PRN
Status: DISCONTINUED | OUTPATIENT
Start: 2024-10-07 | End: 2024-10-07 | Stop reason: HOSPADM

## 2024-10-07 RX ORDER — SIMETHICONE 40MG/0.6ML
SUSPENSION, DROPS(FINAL DOSAGE FORM)(ML) ORAL AS NEEDED
Status: DISCONTINUED | OUTPATIENT
Start: 2024-10-07 | End: 2024-10-07 | Stop reason: HOSPADM

## 2024-10-07 RX ADMIN — SODIUM CHLORIDE 70 ML/HR: 9 INJECTION, SOLUTION INTRAVENOUS at 08:50

## 2024-10-07 RX ADMIN — PROPOFOL 200 MG: 10 INJECTION, EMULSION INTRAVENOUS at 10:46

## 2024-10-07 RX ADMIN — PROPOFOL 200 MG: 10 INJECTION, EMULSION INTRAVENOUS at 10:23

## 2024-10-07 RX ADMIN — LIDOCAINE HYDROCHLORIDE 60 MG: 20 INJECTION, SOLUTION INTRAVENOUS at 10:15

## 2024-10-07 RX ADMIN — PROPOFOL 200 MG: 10 INJECTION, EMULSION INTRAVENOUS at 10:15

## 2024-10-07 RX ADMIN — PROPOFOL 200 MG: 10 INJECTION, EMULSION INTRAVENOUS at 10:36

## 2024-10-07 NOTE — ANESTHESIA PREPROCEDURE EVALUATION
Anesthesia Evaluation     Patient summary reviewed and Nursing notes reviewed   history of anesthetic complications:  PONV  NPO Solid Status: > 8 hours  NPO Liquid Status: > 2 hours           Airway   Mallampati: II  TM distance: >3 FB  Neck ROM: full  Possible difficult intubation  Dental - normal exam     Pulmonary     breath sounds clear to auscultation  Cardiovascular     PT is on anticoagulation therapy  Patient on routine beta blocker    (+) hypertension, CAD, dysrhythmias Atrial Fib, DVT, hyperlipidemia      Neuro/Psych  (+) numbness  GI/Hepatic/Renal/Endo    (+) morbid obesity, liver disease fatty liver disease cirrhosis, renal disease- CRI    Musculoskeletal     Abdominal   (+) obese   Substance History      OB/GYN          Other   arthritis,                       Anesthesia Plan    ASA 3     MAC     intravenous induction     Anesthetic plan, risks, benefits, and alternatives have been provided, discussed and informed consent has been obtained with: patient.  Pre-procedure education provided  Plan discussed with CRNA.        CODE STATUS:

## 2024-10-07 NOTE — DISCHARGE INSTRUCTIONS
- Discharge patient to home (ambulatory).   - Low sodium diet.   - Hold Eliquis for 5 days   - Await pathology results.   - Repeat colonoscopy in 3 years for surveillance.   - Return to GI office in 8 weeks.     No pushing, pulling, tugging,  heavy lifting, or strenuous activity.  No major decision making, driving, or drinking alcoholic beverages for 24 hours. ( due to the medications you have  received)  Always use good hand hygiene/washing techniques.  NO driving while taking pain medications.    * if you have an incision:  Check your incision area every day for signs of infection.   Check for:  * more redness, swelling, or pain  *more fluid or blood  *warmth  *pus or bad smellTo assist you in voiding:  Drink plenty of fluids  Listen to running water while attempting to void.    If you are unable to urinate and you have an uncomfortable urge to void or it has been   6 hours since you were discharged, return to the Emergency Room

## 2024-10-07 NOTE — ANESTHESIA POSTPROCEDURE EVALUATION
Patient: Neal Lopez    Procedure Summary       Date: 10/07/24 Room / Location: [facility] ENDOSCOPY 2 / [facility] ENDOSCOPY    Anesthesia Start: 1010 Anesthesia Stop: 1048    Procedure: Colonoscopy with biopsy and polypectomy and clipping (Anus) Diagnosis:       Encounter for screening for malignant neoplasm of colon      (Encounter for screening for malignant neoplasm of colon [Z12.11])    Surgeons: Brittany Sandoval MD Provider: Rod Hart CRNA    Anesthesia Type: MAC ASA Status: 3            Anesthesia Type: MAC    Vitals  No vitals data found for the desired time range.          Post Anesthesia Care and Evaluation    Patient location during evaluation: bedside  Patient participation: complete - patient participated  Level of consciousness: awake  Pain score: 0  Pain management: adequate    Airway patency: patent  Anesthetic complications: No anesthetic complications  PONV Status: controlled  Cardiovascular status: acceptable and stable  Respiratory status: acceptable and room air  Hydration status: acceptable    Comments: See nursing documentation for post op vital signs

## 2024-10-07 NOTE — H&P
[facility]  HISTORY AND PHYSICAL    Patient Name: Neal Lopez  : 1970  MRN: [id number]    Chief Complaint:   For screening colonoscopy    History Of Presenting Illness:    Average risk screening    Past Medical History:   Diagnosis Date    A-fib     Anemia 2024    Arthritis     Cervical disc disorder     Chronic kidney disease     Cirrhosis     Claustrophobia     Coronary artery disease     Deep vein thrombosis     Elevated cholesterol     Fatty liver     Gout     Hyperlipidemia     Hypertension     Liver disease     Low back pain     Lumbosacral disc disease     Peripheral neuropathy     PONV (postoperative nausea and vomiting)     Pulmonary arterial hypertension     Thoracic disc disorder        Past Surgical History:   Procedure Laterality Date    CARDIAC ABLATION  10/03/2024    THORACIC LAMINECTOMY      Dr. Vital  - EMERGENT    TONSILLECTOMY         Social History     Socioeconomic History    Marital status:    Tobacco Use    Smoking status: Never     Passive exposure: Never   Vaping Use    Vaping status: Never Used   Substance and Sexual Activity    Alcohol use: Never    Drug use: Never    Sexual activity: Defer       Family History   Problem Relation Age of Onset    Arthritis Mother     Colon cancer Neg Hx        Prior to Admission Medications:  Medications Prior to Admission   Medication Sig Dispense Refill Last Dose    albuterol sulfate  (90 Base) MCG/ACT inhaler Inhale 1 puff Every 4 (Four) Hours As Needed.       Allopurinol 200 MG tablet Take 200 mg by mouth Daily.   10/5/2024    apixaban (ELIQUIS) 5 MG tablet tablet Take 1 tablet by mouth 2 (Two) Times a Day.   10/3/2024    bisacodyl (DULCOLAX) 5 MG EC tablet Take as directed for colon prep 4 tablet 0     fluticasone (FLONASE) 50 MCG/ACT nasal spray 1 spray by Each Nare route As Needed.       furosemide (LASIX) 40 MG tablet Take 1 tablet by mouth As Needed.   10/3/2024    leflunomide (ARAVA)  10 MG tablet Take 1 tablet by mouth Daily.   10/5/2024    lisinopril (PRINIVIL,ZESTRIL) 30 MG tablet Take 1 tablet by mouth Daily.   10/5/2024    metoprolol succinate XL (TOPROL-XL) 25 MG 24 hr tablet Take 8 tablets by mouth Daily.   10/5/2024    pregabalin (Lyrica) 75 MG capsule Take 1 capsule by mouth 2 (Two) Times a Day. (Patient taking differently: Take 1 capsule by mouth 2 (Two) Times a Day. On hold for procedure.) 60 capsule 4     sodium-potassium-magnesium sulfates (Suprep Bowel Prep Kit) 17.5-3.13-1.6 GM/177ML solution oral solution Use as directed for colonoscopy prep. Patient has instructions. 177 mL 0 10/5/2024    anastrozole (ARIMIDEX) 1 MG tablet Take 1 tablet by mouth Daily. (Patient not taking: Reported on 10/3/2024)   Not Taking       Allergies:  No Known Allergies     Vitals: Temp:  [98.1 °F (36.7 °C)] 98.1 °F (36.7 °C)  Heart Rate:  [76] 76  Resp:  [16] 16  BP: (156-171)/(106) 156/106    Review Of Systems:  Constitutional:  Negative for chills, fever, and unexpected weight change.  Respiratory:  Negative for cough, chest tightness, shortness of breath, and wheezing.  Cardiovascular:  Negative for chest pain, palpitations, and leg swelling.  Gastrointestinal:  Negative for abdominal distention, abdominal pain, nausea, vomiting.  Neurological:  Negative for weakness, numbness, and headaches.     Physical Exam:    General Appearance:  Alert, cooperative, in no acute distress.   Lungs:   Clear to auscultation, respirations regular, even and                 unlabored.   Heart:  Regular rhythm and normal rate.   Abdomen:   Normal bowel sounds, no masses, no organomegaly. Soft, nontender, nondistended   Neurologic: Alert and oriented x 3. Moves all four limbs equally       Assessment & Plan     Assessment:  Principal Problem:    Encounter for screening for malignant neoplasm of colon      Plan: Colonoscopy with possible biopsy, polypectomy, ablation of arteriovenous malformations, or control of bleeding.  (N/A)     Brittany Sandoval MD  10/7/2024

## 2024-10-08 LAB — REF LAB TEST METHOD: NORMAL

## 2024-10-14 ENCOUNTER — TELEPHONE (OUTPATIENT)
Dept: GASTROENTEROLOGY | Facility: CLINIC | Age: 54
End: 2024-10-14
Payer: COMMERCIAL

## 2024-10-14 NOTE — TELEPHONE ENCOUNTER
Called patient re: confirmation of appt for procedure on 10/21/24.    Left voicemail for patient to call back to confirm.  Confirmation can be left with the [facility].

## 2024-10-18 ENCOUNTER — TELEPHONE (OUTPATIENT)
Dept: GASTROENTEROLOGY | Facility: CLINIC | Age: 54
End: 2024-10-18
Payer: COMMERCIAL

## 2024-11-05 ENCOUNTER — TELEPHONE (OUTPATIENT)
Dept: GASTROENTEROLOGY | Facility: CLINIC | Age: 54
End: 2024-11-05
Payer: COMMERCIAL

## 2024-11-05 NOTE — TELEPHONE ENCOUNTER
The patient called and left a vm, stating that he needed to cancel his EGD, that he would have to call back to reschedule, due to a family emergency.  Patient removed from the schedule.

## 2024-12-02 ENCOUNTER — TRANSCRIBE ORDERS (OUTPATIENT)
Dept: ADMINISTRATIVE | Facility: HOSPITAL | Age: 54
End: 2024-12-02
Payer: COMMERCIAL

## 2024-12-02 DIAGNOSIS — R74.8 ELEVATED LIVER ENZYMES: Primary | ICD-10-CM

## 2024-12-10 ENCOUNTER — OFFICE VISIT (OUTPATIENT)
Dept: GASTROENTEROLOGY | Facility: CLINIC | Age: 54
End: 2024-12-10
Payer: COMMERCIAL

## 2024-12-10 VITALS
SYSTOLIC BLOOD PRESSURE: 130 MMHG | WEIGHT: 305 LBS | HEART RATE: 84 BPM | OXYGEN SATURATION: 98 % | BODY MASS INDEX: 41.37 KG/M2 | DIASTOLIC BLOOD PRESSURE: 82 MMHG

## 2024-12-10 DIAGNOSIS — K58.1 IRRITABLE BOWEL SYNDROME WITH CONSTIPATION: Chronic | ICD-10-CM

## 2024-12-10 DIAGNOSIS — E66.813 CLASS 3 SEVERE OBESITY DUE TO EXCESS CALORIES WITH SERIOUS COMORBIDITY AND BODY MASS INDEX (BMI) OF 40.0 TO 44.9 IN ADULT: Chronic | ICD-10-CM

## 2024-12-10 DIAGNOSIS — D12.6 ADENOMATOUS POLYP OF COLON, UNSPECIFIED PART OF COLON: ICD-10-CM

## 2024-12-10 DIAGNOSIS — K59.00 CONSTIPATION, UNSPECIFIED CONSTIPATION TYPE: Primary | Chronic | ICD-10-CM

## 2024-12-10 DIAGNOSIS — K74.60 CIRRHOSIS OF LIVER WITHOUT ASCITES, UNSPECIFIED HEPATIC CIRRHOSIS TYPE: Chronic | ICD-10-CM

## 2024-12-10 DIAGNOSIS — K75.81 METABOLIC DYSFUNCTION-ASSOCIATED STEATOHEPATITIS (MASH): Chronic | ICD-10-CM

## 2024-12-10 DIAGNOSIS — E66.01 CLASS 3 SEVERE OBESITY DUE TO EXCESS CALORIES WITH SERIOUS COMORBIDITY AND BODY MASS INDEX (BMI) OF 40.0 TO 44.9 IN ADULT: Chronic | ICD-10-CM

## 2024-12-10 DIAGNOSIS — R14.0 BLOATING: Chronic | ICD-10-CM

## 2024-12-10 DIAGNOSIS — D64.9 ANEMIA, UNSPECIFIED TYPE: Chronic | ICD-10-CM

## 2024-12-10 PROCEDURE — 99214 OFFICE O/P EST MOD 30 MIN: CPT | Performed by: NURSE PRACTITIONER

## 2024-12-10 RX ORDER — SODIUM CHLORIDE 9 MG/ML
70 INJECTION, SOLUTION INTRAVENOUS CONTINUOUS PRN
OUTPATIENT
Start: 2024-12-10 | End: 2024-12-11

## 2024-12-10 NOTE — PATIENT INSTRUCTIONS
High fiber, low fat diet with liberal water intake.   Metamucil 1 packet/scoop daily or gummies/capsules 2-4 per day.    Low FODMAP diet - avoid all dairy. May use lactose free/dairy free alternatives such as almond milk, rice milk, oat milk, etc.   Stool softeners 2 per day.   May add Miralax 17 grams daily as needed for constipation.   Continue to avoid all alcohol.   Avoid sodium/salt - including crackers, chips, salt/salt substitutes, canned soups, pickles, etc.   Advised to exercise 30 minutes 4-5 days per week.   Advised to lose 30-40 pounds in the next 6-12 months.  The patient is not immune to hepatitis A or hepatitis B and may obtain vaccines through the health department or PCP office.   Labs  Abdominal ultrasound  Colonoscopy for surveillance in 3 years, October 2027.  Upper endoscopy-EGD: The indications, technique, alternatives and potential risk and complications were discussed with the patient including but not limited to bleeding, perforations, missing lesions and anesthetic complications. The patient understands and wishes to proceed with the procedure and has given their verbal consent. Written patient education information was given to the patient.   The patient will call if they have further questions before procedure.         Low-FODMAP Eating Plan    FODMAP stands for fermentable oligosaccharides, disaccharides, monosaccharides, and polyols. These are sugars that are hard for some people to digest. A low-FODMAP eating plan may help some people who have irritable bowel syndrome (IBS) and certain other bowel (intestinal) diseases to manage their symptoms.  This meal plan can be complicated to follow. Work with a diet and nutrition specialist (dietitian) to make a low-FODMAP eating plan that is right for you. A dietitian can help make sure that you get enough nutrition from this diet.  What are tips for following this plan?  Reading food labels  Check labels for hidden FODMAPs such  as:  High-fructose syrup.  Honey.  Agave.  Natural fruit flavors.  Onion or garlic powder.  Choose low-FODMAP foods that contain 3-4 grams of fiber per serving.  Check food labels for serving sizes. Eat only one serving at a time to make sure FODMAP levels stay low.  Shopping  Shop with a list of foods that are recommended on this diet and make a meal plan.  Meal planning  Follow a low-FODMAP eating plan for up to 6 weeks, or as told by your health care provider or dietitian.  To follow the eating plan:  Eliminate high-FODMAP foods from your diet completely. Choose only low-FODMAP foods to eat. You will do this for 2-6 weeks.  Gradually reintroduce high-FODMAP foods into your diet one at a time. Most people should wait a few days before introducing the next new high-FODMAP food into their meal plan. Your dietitian can recommend how quickly you may reintroduce foods.  Keep a daily record of what and how much you eat and drink. Make note of any symptoms that you have after eating.  Review your daily record with a dietitian regularly to identify which foods you can eat and which foods you should avoid.  General tips  Drink enough fluid each day to keep your urine pale yellow.  Avoid processed foods. These often have added sugar and may be high in FODMAPs.  Avoid most dairy products, whole grains, and sweeteners.  Work with a dietitian to make sure you get enough fiber in your diet.  Avoid high FODMAP foods at meals to manage symptoms.     Recommended foods  Fruits  Bananas, oranges, tangerines, jameel, limes, blueberries, raspberries, strawberries, grapes, cantaloupe, honeydew melon, kiwi, papaya, passion fruit, and pineapple. Limited amounts of dried cranberries, banana chips, and shredded coconut.  Vegetables  Eggplant, zucchini, cucumber, peppers, green beans, bean sprouts, lettuce, arugula, kale, Swiss chard, spinach, shivani greens, bok raemsh, summer squash, potato, and tomato. Limited amounts of corn, carrot, and  "sweet potato. Green parts of scallions.  Grains  Gluten-free grains, such as rice, oats, buckwheat, quinoa, corn, polenta, and millet. Gluten-free pasta, bread, or cereal. Rice noodles. Corn tortillas.  Meats and other proteins  Unseasoned beef, pork, poultry, or fish. Eggs. Ambrose. Tofu (firm) and tempeh. Limited amounts of nuts and seeds, such as almonds, walnuts, brazil nuts, pecans, peanuts, nut butters, pumpkin seeds, jeniffer seeds, and sunflower seeds.  Dairy  Lactose-free milk, yogurt, and kefir. Lactose-free cottage cheese and ice cream. Non-dairy milks, such as almond, coconut, hemp, and rice milk. Non-dairy yogurt. Limited amounts of goat cheese, brie, mozzarella, parmesan, swiss, and other hard cheeses.  Fats and oils  Butter-free spreads. Vegetable oils, such as olive, canola, and sunflower oil.  Seasoning and other foods  Artificial sweeteners with names that do not end in \"ol,\" such as aspartame, saccharine, and stevia. Maple syrup, white table sugar, raw sugar, brown sugar, and molasses. Mayonnaise, soy sauce, and tamari. Fresh basil, coriander, parsley, rosemary, and thyme.  Beverages  Water and mineral water. Sugar-sweetened soft drinks. Small amounts of orange juice or cranberry juice. Black and green tea. Most dry jorge. Coffee.  The items listed above may not be a complete list of foods and beverages you can eat. Contact a dietitian for more information.     Foods to avoid  Fruits  Fresh, dried, and juiced forms of apple, pear, watermelon, peach, plum, cherries, apricots, blackberries, boysenberries, figs, nectarines, and mateo. Avocado.  Vegetables  Chicory root, artichoke, asparagus, cabbage, snow peas, Jber sprouts, broccoli, sugar snap peas, mushrooms, celery, and cauliflower. Onions, garlic, leeks, and the white part of scallions.  Grains  Wheat, including kamut, durum, and semolina. Barley and bulgur. Couscous. Wheat-based cereals. Wheat noodles, bread, crackers, and pastries.  Meats and " other proteins  Fried or fatty meat. Sausage. Cashews and pistachios. Soybeans, baked beans, black beans, chickpeas, kidney beans, prudence beans, navy beans, lentils, black-eyed peas, and split peas.  Dairy  Milk, yogurt, ice cream, and soft cheese. Cream and sour cream. Milk-based sauces. Custard. Buttermilk. Soy milk.  Seasoning and other foods  Any sugar-free gum or candy. Foods that contain artificial sweeteners such as sorbitol, mannitol, isomalt, or xylitol. Foods that contain honey, high-fructose corn syrup, or agave. Bouillon, vegetable stock, beef stock, and chicken stock. Garlic and onion powder. Condiments made with onion, such as hummus, chutney, pickles, relish, salad dressing, and salsa. Tomato paste.  Beverages  Chicory-based drinks. Coffee substitutes. Chamomile tea. Fennel tea. Sweet or fortified jorge such as port or danyell. Diet soft drinks made with isomalt, mannitol, maltitol, sorbitol, or xylitol. Apple, pear, and mateo juice. Juices with high-fructose corn syrup.  The items listed above may not be a complete list of foods and beverages you should avoid. Contact a dietitian for more information.     Summary  FODMAP stands for fermentable oligosaccharides, disaccharides, monosaccharides, and polyols. These are sugars that are hard for some people to digest.  A low-FODMAP eating plan is a short-term diet that helps to ease symptoms of certain bowel diseases.  The eating plan usually lasts up to 6 weeks. After that, high-FODMAP foods are reintroduced gradually and one at a time. This can help you find out which foods may be causing symptoms.  A low-FODMAP eating plan can be complicated. It is best to work with a dietitian who has experience with this type of plan.  This information is not intended to replace advice given to you by your health care provider. Make sure you discuss any questions you have with your health care provider.  Document Revised: 05/06/2021 Document Reviewed:  05/06/2021  [employer] Patient Education © 2023 [employer]

## 2024-12-10 NOTE — PROGRESS NOTES
Follow Up Note     Date: 12/10/2024   Patient Name: Neal Lopez  MRN: [id number]  : 1970     Primary Care Provider: Emmy Aguilar APRN     Chief Complaint   Patient presents with    Follow-up     12/10/2024  History of Present Illness  The patient is a 54-year-old male here for follow-up after a colonoscopy.    He reports experiencing increased abdominal growling and gurgling sounds recently. He does not have any issues with constipation or bowel movements now, bowel habits are regular. Denies abdominal pain, nausea or vomiting. No reflux or difficulty swallowing. Denies GI bleeding.     He had to cancel a scheduled EGD procedure due to a family emergency involving his mother and needs to reschedule.     Interval History:  2024  He reports experiencing bloating and constipation, which began a few months ago. Despite having bowel movements a couple of times a day, he feels they are insufficient. He has not undergone a colonoscopy before. He reports no presence of blood or black coloration in his stool, and also reports no abdominal pain, nausea, vomiting, reflux, or choking during meals. He is not currently taking any pain medications.     He has been on Eliquis for the past 7 to 8 years due to a history of atrial fibrillation. He underwent an ablation procedure in 2024, which successfully treated his atrial fibrillation. However, he was advised to continue with Eliquis as long as it did not cause stomach upset. He has not experienced any issues since then.     He has been informed that he is developing cirrhosis during an ER visit recently. He reports no alcohol consumption, intravenous drug use, tattoos, or blood transfusions. His CT scan also revealed fatty liver disease.     FAMILY HISTORY  He denies any family history of colon cancer, stomach cancer, liver cancer, esophageal cancer or liver disease.     The patient has not had a colonoscopy or EGD in the past.     Subjective       Past Medical History:   Diagnosis Date    A-fib     Anemia 09/17/2024    Arthritis     Cervical disc disorder     Chronic kidney disease     Cirrhosis 2024    Claustrophobia     Coronary artery disease     Deep vein thrombosis     Elevated cholesterol     Fatty liver 2024    Gout     Hyperlipidemia     Hypertension     Liver disease     Low back pain     Lumbosacral disc disease     Peripheral neuropathy     PONV (postoperative nausea and vomiting)     Pulmonary arterial hypertension     Sleep apnea     Thoracic disc disorder      Past Surgical History:   Procedure Laterality Date    CARDIAC ABLATION  10/03/2024    COLONOSCOPY N/A 10/7/2024    Procedure: Colonoscopy with biopsy and polypectomy and clipping;  Surgeon: Brittany Sandoval MD;  Location: [facility] END[facility]COPY;  Service: Gastroenterology;  Laterality: N/A;    THORACIC LAMINECTOMY  2020    Dr. Vital  - EMERGENT    TONSILLECTOMY       Family History   Problem Relation Age of Onset    Arthritis Mother     Colon cancer Neg Hx      Social History     Socioeconomic History    Marital status:    Tobacco Use    Smoking status: Never     Passive exposure: Never   Vaping Use    Vaping status: Never Used   Substance and Sexual Activity    Alcohol use: Never    Drug use: Never    Sexual activity: Defer       Current Outpatient Medications:     albuterol sulfate  (90 Base) MCG/ACT inhaler, Inhale 1 puff Every 4 (Four) Hours As Needed., Disp: , Rfl:     Allopurinol 200 MG tablet, Take 200 mg by mouth Daily., Disp: , Rfl:     apixaban (ELIQUIS) 5 MG tablet tablet, Take 1 tablet by mouth 2 (Two) Times a Day., Disp: , Rfl:     fluticasone (FLONASE) 50 MCG/ACT nasal spray, 1 spray by Each Nare route As Needed., Disp: , Rfl:     furosemide (LASIX) 40 MG tablet, Take 1 tablet by mouth As Needed., Disp: , Rfl:     lisinopril (PRINIVIL,ZESTRIL) 30 MG tablet, Take 1 tablet by mouth Daily., Disp: , Rfl:     metoprolol succinate XL (TOPROL-XL) 25 MG 24 hr  tablet, Take 8 tablets by mouth Daily., Disp: , Rfl:     pregabalin (Lyrica) 75 MG capsule, Take 1 capsule by mouth 2 (Two) Times a Day. (Patient taking differently: Take 1 capsule by mouth 2 (Two) Times a Day. On hold for procedure.), Disp: 60 capsule, Rfl: 4    anastrozole (ARIMIDEX) 1 MG tablet, Take 1 tablet by mouth Daily. (Patient not taking: Reported on 12/10/2024), Disp: , Rfl:     No Known Allergies    The following portions of the patient's history were reviewed and updated as appropriate: allergies, current medications, past family history, past medical history, past social history, past surgical history and problem list.  Objective     Physical Exam  Vitals and nursing note reviewed.   Constitutional:       General: He is not in acute distress.     Appearance: Normal appearance. He is well-developed.   HENT:      Head: Normocephalic and atraumatic.      Mouth/Throat:      Mouth: Mucous membranes are not pale, not dry and not cyanotic.   Eyes:      General: Lids are normal.   Neck:      Trachea: Trachea normal.   Cardiovascular:      Rate and Rhythm: Normal rate.   Pulmonary:      Effort: Pulmonary effort is normal. No respiratory distress.      Breath sounds: Normal breath sounds.   Abdominal:      Tenderness: There is no abdominal tenderness.   Skin:     General: Skin is warm and dry.   Neurological:      Mental Status: He is alert and oriented to person, place, and time.   Psychiatric:         Mood and Affect: Mood normal.         Speech: Speech normal.         Behavior: Behavior normal. Behavior is cooperative.       Vitals:    12/10/24 1334   BP: 130/82   Pulse: 84   SpO2: 98%   Weight: (!) 138 kg (305 lb)     Body mass index is 41.37 kg/m².     Results Review:   I reviewed the patient's new clinical results.    Admission on 10/07/2024, Discharged on 10/07/2024   Component Date Value Ref Range Status    Reference Lab Report 10/07/2024    Final                    Value:Pathology & Cytology  Laboratories  [address] [place] [address]  Phone: [phone] or [phone]  Fax: [phone]  Lucas Fisher M.D., Medical Director    PATIENT NAME                                     LABORATORY NO.  DEYANIRA COLVIN                                  [id number]  [id number]                                 AGE                    SEX   SSN              CLIENT REF #  [facility]                    54        1970           [id number]      [id number]    [address]                        REQUESTING M.D.           ATTENDING M.D.         COPY TO.   [address]                                [place] [id number]                         [facility]  DATE COLLECTED            DATE RECEIVED          DATE REPORTED  10/07/2024                10/07/2024             10/08/2024    DIAGNOSIS:  A.     ASCENDING COLON POLYP:  Benign inflammatory polyp  B.     TERMINAL ILEUM BIOPSY:  Small intestinal mucosa with no                           significant histopathologic abnormalities  C.     TRANSVERSE COLON POLYP:  Adenomatous polyp (tubular adenoma)  Negative for high-grade dysplasia  D.     DESCENDING COLON POLYP, X2:  Adenomatous polyps (tubular adenomas)  Negative for high-grade dysplasia    STACY      REVIEWED, DIAGNOSED AND ELECTRONICALLY  SIGNED BY:    Jayden Aponte M.D., F.C.A.P.  CPT CODES:  88305x4     Lab on 2024   Component Date Value Ref Range Status    Hepatitis C Ab 2024 Non-Reactive  Non-Reactive Final    WBC 2024 5.32  3.40 - 10.80 10*3/mm3 Final    RBC 2024 5.39  4.14 - 5.80 10*6/mm3 Final    Hemoglobin 2024 13.5  13.0 - 17.7 g/dL Final    Hematocrit 2024 44.4  37.5 - 51.0 % Final    MCV 2024 82.4  79.0 - 97.0 fL Final    MCH 2024 25.0 (L)  26.6 - 33.0 pg Final    MCHC 2024 30.4 (L)  31.5 - 35.7 g/dL Final    RDW 2024 15.8 (H)  12.3 - 15.4 % Final    RDW-SD 2024 47.0  37.0 - 54.0 fl Final     MPV 09/17/2024 12.0  6.0 - 12.0 fL Final    Platelets 09/17/2024 231  140 - 450 10*3/mm3 Final    Glucose 09/17/2024 95  65 - 99 mg/dL Final    BUN 09/17/2024 20  6 - 20 mg/dL Final    Creatinine 09/17/2024 1.50 (H)  0.76 - 1.27 mg/dL Final    Sodium 09/17/2024 143  136 - 145 mmol/L Final    Potassium 09/17/2024 3.5  3.5 - 5.2 mmol/L Final    Chloride 09/17/2024 100  98 - 107 mmol/L Final    CO2 09/17/2024 29.6 (H)  22.0 - 29.0 mmol/L Final    Calcium 09/17/2024 9.1  8.6 - 10.5 mg/dL Final    Total Protein 09/17/2024 6.9  6.0 - 8.5 g/dL Final    Albumin 09/17/2024 4.0  3.5 - 5.2 g/dL Final    ALT (SGPT) 09/17/2024 22  1 - 41 U/L Final    AST (SGOT) 09/17/2024 30  1 - 40 U/L Final    Alkaline Phosphatase 09/17/2024 201 (H)  39 - 117 U/L Final    Total Bilirubin 09/17/2024 1.2  0.0 - 1.2 mg/dL Final    Globulin 09/17/2024 2.9  gm/dL Final    A/G Ratio 09/17/2024 1.4  g/dL Final    BUN/Creatinine Ratio 09/17/2024 13.3  7.0 - 25.0 Final    Anion Gap 09/17/2024 13.4  5.0 - 15.0 mmol/L Final    eGFR 09/17/2024 54.3 (L)  >60.0 mL/min/1.73 Final    Protime 09/17/2024 18.4 (H)  12.3 - 15.1 Seconds Final    INR 09/17/2024 1.47 (H)  0.90 - 1.10 Final    Hep A Total Ab 09/17/2024 Negative  Negative Final    Hep B S Ab 09/17/2024 Non-Reactive   Final    Hepatitis B Surface Ag 09/17/2024 Non-Reactive  Non-Reactive Final    Hep B Core Total Ab 09/17/2024 Negative  Negative Final    Smooth Muscle Ab 09/17/2024 5  0 - 19 Units Final    Mitochondrial Ab 09/17/2024 <20.0  0.0 - 20.0 Units Final    ALPHA -1 ANTITRYPSIN 09/17/2024 193  90 - 200 mg/dL Final    Fibrosis Score 09/17/2024 0.47 (H)  0.00 - 0.21 Final    Fibrosis Stage 09/17/2024 F1-F2   Final    Steatosis Score (Reference) 09/17/2024 0.67 (H)  0.00 - 0.40 Final    Steatosis Grade (Reference) 09/17/2024 Comment   Final                    S2 - S3  Moderate to Severe Steatosis                           (Clinically Significant) (%)    HAWKINS Score (Reference) 09/17/2024 0.60  (H)  0.00 - 0.25 Final    Hawkins Grade (Reference) 09/17/2024 Comment   Final                       N2 - Moderate HAWKINS    Methodology: 09/17/2024 Comment   Final    The analytes tested are performed by FibroSure-Specific methods.  Not intended for use with other diagnostic considerations.    Alpha 2-Macroglobulins, Qn 09/17/2024 162  110 - 276 mg/dL Final    Haptoglobin 09/17/2024 185  29 - 370 mg/dL Final    Apolipoprotein A-1 09/17/2024 95 (L)  101 - 178 mg/dL Final    Total Bilirubin 09/17/2024 0.6  0.0 - 1.2 mg/dL Final    GGT 09/17/2024 192 (H)  0 - 65 IU/L Final    ALT (SGPT) 09/17/2024 22  0 - 55 IU/L Final    AST (SGOT) P5P (Reference) 09/17/2024 29  0 - 40 IU/L Final    Cholesterol, Total (Reference) 09/17/2024 117  100 - 199 mg/dL Final    Glucose, Serum (Reference) 09/17/2024 100 (H)  70 - 99 mg/dL Final    Triglycerides 09/17/2024 82  0 - 149 mg/dL Final      Dated 12/12/2023  TSH 1.61, total cholesterol 150, triglycerides 140     Dated 1/18/2024  iron 42, iron saturation 17%, ferritin 131, NATHANIEL Negative, B12 255     NATHANIEL Direct Reflex to 11 Biomarker (01/15/2024 18:56)  ED HIV 1/2 Antibody/Antigen Screen w/Reflex to HIV 1/2 Differentiation (03/30/2024 17:46)     CBC AND DIFFERENTIAL (08/21/2024 09:17)  RENAL FUNCTION PANEL (08/21/2024 09:17)  RENAL FUNCTION PANEL (09/09/2024 14:13)     Dated 11/14/2024 total bilirubin 1.6 alkaline phosphatase 286 AST 25 ALT 19 hemoglobin 14.1 hematocrit 45.5 platelet count 270    CT Abdomen Pelvis With Contrast     Result Date: 7/15/2024  1. Cirrhosis with a small amount of ascites. 2. Bilateral pleural effusions with lower lobe airspace disease favored to be atelectasis although pneumonia not entirely excluded. 3. Small hyperdense pericardial effusion. Hemopericardium not excluded.        XR Chest 1 View     Result Date: 7/15/2024  New interstitial opacities. Favor pulmonary edema.          Colonoscopy dated 10/7/2024 per Dr. Sandoval  - Hemorrhoids found on perianal  exam.  - Diverticulosis in the sigmoid colon.  - One 6 mm polyp in the proximal ascending colon, removed with a hot snare. Resected and retrieved.  - One 4 mm polyp in the mid transverse colon, removed with a hot snare. Resected and retrieved.  - One 10 mm polyp in the proximal descending colon, removed with a hot snare. Resected and retrieved. Clip  was placed. Clip : [employer].  - One 6 mm polyp in the mid descending colon, removed with a hot snare. Resected and retrieved.  - The examined portion of the ileum was normal. Biopsied for anemia.  - Non-bleeding external and internal hemorrhoids.  A.     ASCENDING COLON POLYP:  Benign inflammatory polyp  B.     TERMINAL ILEUM BIOPSY:  Small intestinal mucosa with no significant histopathologic abnormalities  C.     TRANSVERSE COLON POLYP:  Adenomatous polyp (tubular adenoma)  Negative for high-grade dysplasia  D.     DESCENDING COLON POLYP, X2:  Adenomatous polyps (tubular adenomas)  Negative for high-grade dysplasia      Assessment / Plan      1. Constipation, unspecified constipation type  2. Bloating  3. Irritable bowel syndrome with constipation  Symptoms have improved, he is not having any problems with constipation or bloating at this time.  Denies any GI bleeding. TSH normal. CTAP 7/15/2024 with cirrhosis, otherwise no abnormality of GI tract noted.  Colonoscopy 10/7/2024 with polyps removed, otherwise unremarkable.  Terminal ileum biopsy unremarkable.  Likely IBS.  Low FODMAP diet  Metamucil or fiber gummies/capsules daily.   Stool softeners 2 per day.   May take Miralax 17 grams daily as needed.     4. Anemia, unspecified type  Previous labs with mild anemia.  Recent labs normal.  No history of GI bleeding.  CTAP 7/15/2024 with cirrhosis, otherwise GI tract unremarkable.  Colonoscopy 10/7/2024 with polyps removed, otherwise unremarkable.  Terminal ileum biopsy unremarkable.  He had to cancel his EGD due to family emergency.  Will reschedule  his EGD to rule out upper GI source of anemia.    - Case Request    5. Metabolic dysfunction-associated steatohepatitis (MASH)  6. Cirrhosis of liver without ascites, unspecified hepatic cirrhosis type  7. Class 3 severe obesity due to excess calories with serious comorbidity and body mass index (BMI) of 40.0 to 44.9 in adult  - MELD score 15 per labs 9/17/2024  - Child Mccoy A per labs 9/172024  - FIB4 1.19 per labs 11/14/2024  BMI 41.37   Recent labs with elevated liver enzymes. He reports no alcohol consumption, intravenous drug use, tattoos, or blood transfusions. CTAP 7/15/2024 with fatty liver and changes consistent with cirrhosis. NATHANIEL negative. HIV negative. PT/INR abnormal: INR 1.47. He is not immune to hepatitis A or hepatitis B.  He is negative for hepatitis B and hepatitis C.  NATHANIEL negative.  Previous iron level low.  Smooth muscle antibody normal.  Mitochondrial antibody normal.  Alpha-1 antitrypsin normal.  FibroSure with moderate HAWKINS N2/S2-S3/F1-F2.  Triglycerides 82.  Advise low-fat diet, exercise and weight reduction.  Advised avoid sodium  The patient is not immune to hepatitis A or hepatitis B and may obtain vaccines through the health department or PCP office.  Labs  Abdominal ultrasound  EGD to rule out esophageal varices    - CBC (No Diff); Future  - Comprehensive Metabolic Panel; Future  - Protime-INR; Future  - US Abdomen Complete; Future  - Case Request    8. Adenomatous polyp of colon, unspecified part of colon  Colonoscopy dated 10/7/2024 with polyps removed, tubular adenomas without dysplasia.  No family history of colon cancer.  Colonoscopy for surveillance in 3 years, October 2027.    Patient Instructions   High fiber, low fat diet with liberal water intake.   Metamucil 1 packet/scoop daily or gummies/capsules 2-4 per day.    Low FODMAP diet - avoid all dairy. May use lactose free/dairy free alternatives such as almond milk, rice milk, oat milk, etc.   Stool softeners 2 per day.   May add  Miralax 17 grams daily as needed for constipation.   Continue to avoid all alcohol.   Avoid sodium/salt - including crackers, chips, salt/salt substitutes, canned soups, pickles, etc.   Advised to exercise 30 minutes 4-5 days per week.   Advised to lose 30-40 pounds in the next 6-12 months.  The patient is not immune to hepatitis A or hepatitis B and may obtain vaccines through the health department or PCP office.   Labs  Abdominal ultrasound  Colonoscopy for surveillance in 3 years, October 2027.  Upper endoscopy-EGD: The indications, technique, alternatives and potential risk and complications were discussed with the patient including but not limited to bleeding, perforations, missing lesions and anesthetic complications. The patient understands and wishes to proceed with the procedure and has given their verbal consent. Written patient education information was given to the patient.   The patient will call if they have further questions before procedure.         Low-FODMAP Eating Plan    FODMAP stands for fermentable oligosaccharides, disaccharides, monosaccharides, and polyols. These are sugars that are hard for some people to digest. A low-FODMAP eating plan may help some people who have irritable bowel syndrome (IBS) and certain other bowel (intestinal) diseases to manage their symptoms.  This meal plan can be complicated to follow. Work with a diet and nutrition specialist (dietitian) to make a low-FODMAP eating plan that is right for you. A dietitian can help make sure that you get enough nutrition from this diet.  What are tips for following this plan?  Reading food labels  Check labels for hidden FODMAPs such as:  High-fructose syrup.  Honey.  Agave.  Natural fruit flavors.  Onion or garlic powder.  Choose low-FODMAP foods that contain 3-4 grams of fiber per serving.  Check food labels for serving sizes. Eat only one serving at a time to make sure FODMAP levels stay low.  Shopping  Shop with a list of  foods that are recommended on this diet and make a meal plan.  Meal planning  Follow a low-FODMAP eating plan for up to 6 weeks, or as told by your health care provider or dietitian.  To follow the eating plan:  Eliminate high-FODMAP foods from your diet completely. Choose only low-FODMAP foods to eat. You will do this for 2-6 weeks.  Gradually reintroduce high-FODMAP foods into your diet one at a time. Most people should wait a few days before introducing the next new high-FODMAP food into their meal plan. Your dietitian can recommend how quickly you may reintroduce foods.  Keep a daily record of what and how much you eat and drink. Make note of any symptoms that you have after eating.  Review your daily record with a dietitian regularly to identify which foods you can eat and which foods you should avoid.  General tips  Drink enough fluid each day to keep your urine pale yellow.  Avoid processed foods. These often have added sugar and may be high in FODMAPs.  Avoid most dairy products, whole grains, and sweeteners.  Work with a dietitian to make sure you get enough fiber in your diet.  Avoid high FODMAP foods at meals to manage symptoms.     Recommended foods  Fruits  Bananas, oranges, tangerines, jameel, limes, blueberries, raspberries, strawberries, grapes, cantaloupe, honeydew melon, kiwi, papaya, passion fruit, and pineapple. Limited amounts of dried cranberries, banana chips, and shredded coconut.  Vegetables  Eggplant, zucchini, cucumber, peppers, green beans, bean sprouts, lettuce, arugula, kale, Swiss chard, spinach, shivani greens, bok ramesh, summer squash, potato, and tomato. Limited amounts of corn, carrot, and sweet potato. Green parts of scallions.  Grains  Gluten-free grains, such as rice, oats, buckwheat, quinoa, corn, polenta, and millet. Gluten-free pasta, bread, or cereal. Rice noodles. Corn tortillas.  Meats and other proteins  Unseasoned beef, pork, poultry, or fish. Eggs. Ambrose. Tofu (firm) and  "tempeh. Limited amounts of nuts and seeds, such as almonds, walnuts, brazil nuts, pecans, peanuts, nut butters, pumpkin seeds, jeniffer seeds, and sunflower seeds.  Dairy  Lactose-free milk, yogurt, and kefir. Lactose-free cottage cheese and ice cream. Non-dairy milks, such as almond, coconut, hemp, and rice milk. Non-dairy yogurt. Limited amounts of goat cheese, brie, mozzarella, parmesan, swiss, and other hard cheeses.  Fats and oils  Butter-free spreads. Vegetable oils, such as olive, canola, and sunflower oil.  Seasoning and other foods  Artificial sweeteners with names that do not end in \"ol,\" such as aspartame, saccharine, and stevia. Maple syrup, white table sugar, raw sugar, brown sugar, and molasses. Mayonnaise, soy sauce, and tamari. Fresh basil, coriander, parsley, rosemary, and thyme.  Beverages  Water and mineral water. Sugar-sweetened soft drinks. Small amounts of orange juice or cranberry juice. Black and green tea. Most dry jorge. Coffee.  The items listed above may not be a complete list of foods and beverages you can eat. Contact a dietitian for more information.     Foods to avoid  Fruits  Fresh, dried, and juiced forms of apple, pear, watermelon, peach, plum, cherries, apricots, blackberries, boysenberries, figs, nectarines, and mateo. Avocado.  Vegetables  Chicory root, artichoke, asparagus, cabbage, snow peas, [place] sprouts, broccoli, sugar snap peas, mushrooms, celery, and cauliflower. Onions, garlic, leeks, and the white part of scallions.  Grains  Wheat, including kamut, durum, and semolina. Barley and bulgur. Couscous. Wheat-based cereals. Wheat noodles, bread, crackers, and pastries.  Meats and other proteins  Fried or fatty meat. Sausage. Cashews and pistachios. Soybeans, baked beans, black beans, chickpeas, kidney beans, prudence beans, navy beans, lentils, black-eyed peas, and split peas.  Dairy  Milk, yogurt, ice cream, and soft cheese. Cream and sour cream. Milk-based sauces. Custard. " Buttermilk. Soy milk.  Seasoning and other foods  Any sugar-free gum or candy. Foods that contain artificial sweeteners such as sorbitol, mannitol, isomalt, or xylitol. Foods that contain honey, high-fructose corn syrup, or agave. Bouillon, vegetable stock, beef stock, and chicken stock. Garlic and onion powder. Condiments made with onion, such as hummus, chutney, pickles, relish, salad dressing, and salsa. Tomato paste.  Beverages  Chicory-based drinks. Coffee substitutes. Chamomile tea. Fennel tea. Sweet or fortified jorge such as port or danyell. Diet soft drinks made with isomalt, mannitol, maltitol, sorbitol, or xylitol. Apple, pear, and mateo juice. Juices with high-fructose corn syrup.  The items listed above may not be a complete list of foods and beverages you should avoid. Contact a dietitian for more information.     Summary  FODMAP stands for fermentable oligosaccharides, disaccharides, monosaccharides, and polyols. These are sugars that are hard for some people to digest.  A low-FODMAP eating plan is a short-term diet that helps to ease symptoms of certain bowel diseases.  The eating plan usually lasts up to 6 weeks. After that, high-FODMAP foods are reintroduced gradually and one at a time. This can help you find out which foods may be causing symptoms.  A low-FODMAP eating plan can be complicated. It is best to work with a dietitian who has experience with this type of plan.  This information is not intended to replace advice given to you by your health care provider. Make sure you discuss any questions you have with your health care provider.  Document Revised: 05/06/2021 Document Reviewed: 05/06/2021  Elsevier Patient Education © 2023 [employer]                Julia Lamb, SERA  12/10/2024    Please note that portions of this note were completed with a voice recognition program.     Patient or patient representative verbalized consent for the use of Ambient Listening during the visit with   SERA Polk for chart documentation. 12/10/2024  14:19 EST

## 2024-12-17 ENCOUNTER — HOSPITAL ENCOUNTER (OUTPATIENT)
Dept: ULTRASOUND IMAGING | Facility: HOSPITAL | Age: 54
Discharge: HOME | End: 2024-12-17
Admitting: NURSE PRACTITIONER
Payer: COMMERCIAL

## 2024-12-17 DIAGNOSIS — K75.81 METABOLIC DYSFUNCTION-ASSOCIATED STEATOHEPATITIS (MASH): Chronic | ICD-10-CM

## 2024-12-17 DIAGNOSIS — K74.60 CIRRHOSIS OF LIVER WITHOUT ASCITES, UNSPECIFIED HEPATIC CIRRHOSIS TYPE: Chronic | ICD-10-CM

## 2024-12-17 PROCEDURE — 76700 US EXAM ABDOM COMPLETE: CPT

## 2024-12-18 ENCOUNTER — TELEPHONE (OUTPATIENT)
Dept: GASTROENTEROLOGY | Facility: CLINIC | Age: 54
End: 2024-12-18
Payer: COMMERCIAL

## 2024-12-18 NOTE — TELEPHONE ENCOUNTER
----- Message from Julia Lamb sent at 12/17/2024  5:16 PM EST -----  Let him know his ultrasound continues to show changes consistent with cirrhosis and shows a small to moderate amount of ascites. He needs to make sure he is avoiding ALL sodium - including salt, salt substitutes, crackers, chips, canned soups, pickles, soy sauce, ketchup, etc. The sodium will cause him to retain increased fluid. Make sure to keep appointment for EGD.

## 2025-01-08 RX ORDER — PREGABALIN 75 MG/1
75 CAPSULE ORAL 2 TIMES DAILY
Qty: 60 CAPSULE | Refills: 0 | Status: SHIPPED | OUTPATIENT
Start: 2025-01-08

## 2025-01-08 NOTE — TELEPHONE ENCOUNTER
Rx Refill Note  Requested Prescriptions     Pending Prescriptions Disp Refills    pregabalin (LYRICA) 75 MG capsule [Pharmacy Med Name: PREGABALIN 75 MG CAPSULE] 60 capsule 0     Sig: Take 1 capsule by mouth 2 (Two) Times a Day. Must schedule and keep appt      Last filled: 6/17/24 for 5 mos total    Last office visit with prescribing clinician: 6/17/2024      Next office visit with prescribing clinician: Visit date not found. Needs appt. Pt cancelled two in Sept and has yet to r/s    Augustine Waddell MA  01/08/25, 14:47 EST

## 2025-01-15 ENCOUNTER — TELEPHONE (OUTPATIENT)
Dept: GASTROENTEROLOGY | Facility: CLINIC | Age: 55
End: 2025-01-15
Payer: COMMERCIAL

## 2025-01-15 NOTE — TELEPHONE ENCOUNTER
Called patient re: confirmation of appt for procedure on 01/22/25.   Left voicemail for patient to call back to confirm.

## 2025-01-17 NOTE — PRE-PROCEDURE INSTRUCTIONS
PAT phone history completed with patient for upcoming procedure on 1/22/25 with Dr. Sandoval.    PAT PASS reviewed with patient and they verbalize understanding of the following:     Do not eat or drink anything after midnight the night before procedure unless otherwise instructed by physician/surgeon's office, this includes no gum, candy, mints, tobacco products or e-cigarettes.  Do not shave the area to be operated on at least 48 hours prior to procedure.  Do not wear makeup, lotion, hair products, or nail polish.  Do not wear any jewelry and remove all piercings.  Do not wear any adhesive if you wear dentures.  Do not wear contacts; bring in glasses if needed.  Only take medications on the morning of procedure as instructed by PAT nurse per anesthesia guidelines or as instructed by physician's office.  If you are on any blood thinners reach out to the physician/surgeon's office for instructions on when/if they will need to be stopped prior to procedure.  Bring in picture ID and insurance card, advanced directive copies if applicable, CPAP/BIPAP/Inhalers if indicated morning of procedure, leave any other valuables at home.  Ensure you have arranged for someone to drive you home the day of your procedure and someone to care for you at home afterwards. It is recommended that you do not drive, drink alcohol, or make any major legal decisions for at least 24 hours after your procedure is complete.  ERAS instructions given unless otherwise instructed per surgeon's orders.    Instructions given on hospital entrance and registration location.

## 2025-01-22 ENCOUNTER — HOSPITAL ENCOUNTER (OUTPATIENT)
Facility: HOSPITAL | Age: 55
Setting detail: HOSPITAL OUTPATIENT SURGERY
Discharge: HOME | End: 2025-01-22
Attending: INTERNAL MEDICINE | Admitting: INTERNAL MEDICINE
Payer: COMMERCIAL

## 2025-01-22 ENCOUNTER — ANESTHESIA EVENT (OUTPATIENT)
Dept: GASTROENTEROLOGY | Facility: HOSPITAL | Age: 55
End: 2025-01-22
Payer: COMMERCIAL

## 2025-01-22 ENCOUNTER — ANESTHESIA (OUTPATIENT)
Dept: GASTROENTEROLOGY | Facility: HOSPITAL | Age: 55
End: 2025-01-22
Payer: COMMERCIAL

## 2025-01-22 VITALS
BODY MASS INDEX: 39.28 KG/M2 | WEIGHT: 290 LBS | HEART RATE: 68 BPM | HEIGHT: 72 IN | TEMPERATURE: 98 F | SYSTOLIC BLOOD PRESSURE: 132 MMHG | OXYGEN SATURATION: 96 % | RESPIRATION RATE: 17 BRPM | DIASTOLIC BLOOD PRESSURE: 91 MMHG

## 2025-01-22 DIAGNOSIS — D64.9 ANEMIA, UNSPECIFIED TYPE: ICD-10-CM

## 2025-01-22 DIAGNOSIS — K74.60 CIRRHOSIS OF LIVER WITHOUT ASCITES, UNSPECIFIED HEPATIC CIRRHOSIS TYPE: ICD-10-CM

## 2025-01-22 PROCEDURE — 43239 EGD BIOPSY SINGLE/MULTIPLE: CPT | Performed by: INTERNAL MEDICINE

## 2025-01-22 PROCEDURE — 25010000002 PROPOFOL 200 MG/20ML EMULSION: Performed by: NURSE ANESTHETIST, CERTIFIED REGISTERED

## 2025-01-22 RX ORDER — PROPOFOL 10 MG/ML
INJECTION, EMULSION INTRAVENOUS AS NEEDED
Status: DISCONTINUED | OUTPATIENT
Start: 2025-01-22 | End: 2025-01-22 | Stop reason: SURG

## 2025-01-22 RX ORDER — PANTOPRAZOLE SODIUM 40 MG/1
40 TABLET, DELAYED RELEASE ORAL DAILY
Qty: 30 TABLET | Refills: 2 | Status: SHIPPED | OUTPATIENT
Start: 2025-01-22

## 2025-01-22 RX ORDER — LIDOCAINE HCL/PF 100 MG/5ML
SYRINGE (ML) INJECTION AS NEEDED
Status: DISCONTINUED | OUTPATIENT
Start: 2025-01-22 | End: 2025-01-22 | Stop reason: SURG

## 2025-01-22 RX ADMIN — Medication 40 MG: at 09:53

## 2025-01-22 RX ADMIN — Medication 20 MG: at 09:53

## 2025-01-22 RX ADMIN — PROPOFOL 50 MG: 10 INJECTION, EMULSION INTRAVENOUS at 09:58

## 2025-01-22 RX ADMIN — PROPOFOL 100 MG: 10 INJECTION, EMULSION INTRAVENOUS at 09:53

## 2025-01-22 NOTE — DISCHARGE INSTRUCTIONS
- Discharge patient to home (ambulatory).   - Low sodium diet.   - Continue present medications.    - PPI daily  - Hold eliquis for one day   - Await pathology results.  - Repeat upper endoscopy in 2-3 years for surveillance.   - Return to my office in 8 weeks.    Rest today  No pushing,pulling,tugging,heavy lifting, or strenuous activity   No major decision making,driving,or drinking alcoholic beverages for 24 hours due to the sedation you received  Always use good hand hygiene/washing technique  No driving on pain medication.  To assist you in voiding:  Drink plenty of fluids  Listen to running water while attempting to void.    If you are unable to urinate and you have an uncomfortable urge to void or it has been   6 hours since you were discharged, return to the Emergency Room

## 2025-01-22 NOTE — ANESTHESIA PREPROCEDURE EVALUATION
Anesthesia Evaluation     Patient summary reviewed and Nursing notes reviewed   history of anesthetic complications:  PONV  NPO Solid Status: > 8 hours  NPO Liquid Status: > 2 hours           Airway   Mallampati: II  TM distance: >3 FB  Neck ROM: full  Possible difficult intubation  Dental - normal exam     Pulmonary     breath sounds clear to auscultation  (+) ,sleep apnea on CPAP  Cardiovascular     PT is on anticoagulation therapy  Patient on routine beta blocker    (+) hypertension, CAD, dysrhythmias Atrial Fib, DVT, hyperlipidemia      Neuro/Psych  (+) numbness  GI/Hepatic/Renal/Endo    (+) morbid obesity, hepatitis, liver disease fatty liver disease cirrhosis, renal disease- CRI    Musculoskeletal     Abdominal   (+) obese   Substance History      OB/GYN          Other   arthritis,                   Anesthesia Plan    ASA 3     MAC     (Risks and benefits discussed including risk of aspiration, recall and dental damage. All patient questions answered. Will continue with POC. )  intravenous induction     Anesthetic plan, risks, benefits, and alternatives have been provided, discussed and informed consent has been obtained with: patient.  Pre-procedure education provided      CODE STATUS:

## 2025-01-22 NOTE — ANESTHESIA POSTPROCEDURE EVALUATION
Patient: Neal Lopez    Procedure Summary       Date: 01/22/25 Room / Location: [facility] ENDOSCOPY 2 / [facility] ENDOSCOPY    Anesthesia Start: 0944 Anesthesia Stop: 1000    Procedure: Esophagogastroduodenoscopy with biopsies (Esophagus) Diagnosis:       Anemia, unspecified type      Cirrhosis of liver without ascites, unspecified hepatic cirrhosis type      (Anemia, unspecified type [D64.9])      (Cirrhosis of liver without ascites, unspecified hepatic cirrhosis type [K74.60])    Surgeons: Birttany Sandoval MD Provider: Rick Friend CRNA    Anesthesia Type: MAC ASA Status: 3            Anesthesia Type: MAC    Vitals  HR 77  Sat 97  Resp 15  /99  Temp 98        Post Anesthesia Care and Evaluation    Patient location during evaluation: bedside  Patient participation: complete - patient participated  Level of consciousness: awake and alert  Pain score: 0  Pain management: adequate    Airway patency: patent  Anesthetic complications: No anesthetic complications  PONV Status: none  Cardiovascular status: acceptable  Respiratory status: acceptable and nasal cannula  Hydration status: acceptable

## 2025-01-22 NOTE — H&P
[facility]  HISTORY AND PHYSICAL    Patient Name: Neal Lopez  : 1970  MRN: [id number]    Chief Complaint:   For EGD    History Of Presenting Illness:    Cirrhosis to rule out Esophageal varices  Anemia    Past Medical History:   Diagnosis Date    A-fib     Anemia 2024    Arthritis     Cervical disc disorder     Chronic kidney disease     Cirrhosis     Claustrophobia     Coronary artery disease     Elevated cholesterol     Fatty liver     Gout     Hyperlipidemia     Hypertension     Liver disease     Low back pain     Lumbosacral disc disease     Peripheral neuropathy     PONV (postoperative nausea and vomiting)     Pulmonary arterial hypertension     Sleep apnea     CPAP    Thoracic disc disorder        Past Surgical History:   Procedure Laterality Date    CARDIAC ABLATION  10/03/2024    COLONOSCOPY N/A 10/7/2024    Procedure: Colonoscopy with biopsy and polypectomy and clipping;  Surgeon: Brittany Sandoval MD;  Location: [facility] ENDOSCOPY;  Service: Gastroenterology;  Laterality: N/A;    THORACIC LAMINECTOMY      Dr. Vital  - EMERGENT    TONSILLECTOMY         Social History     Socioeconomic History    Marital status:    Tobacco Use    Smoking status: Never     Passive exposure: Never    Smokeless tobacco: Never   Vaping Use    Vaping status: Never Used   Substance and Sexual Activity    Alcohol use: Never    Drug use: Never    Sexual activity: Defer       Family History   Problem Relation Age of Onset    Arthritis Mother     Colon cancer Neg Hx        Prior to Admission Medications:  Medications Prior to Admission   Medication Sig Dispense Refill Last Dose/Taking    albuterol sulfate  (90 Base) MCG/ACT inhaler Inhale 1 puff Every 4 (Four) Hours As Needed.   2025    Allopurinol 200 MG tablet Take 200 mg by mouth Daily.   2025    apixaban (ELIQUIS) 5 MG tablet tablet Take 1 tablet by mouth 2 (Two) Times a Day.   2025    fluticasone  (FLONASE) 50 MCG/ACT nasal spray 1 spray by Each Nare route As Needed.   1/21/2025    furosemide (LASIX) 40 MG tablet Take 1 tablet by mouth As Needed.   1/21/2025    lisinopril (PRINIVIL,ZESTRIL) 30 MG tablet Take 1 tablet by mouth Daily.   1/21/2025    metoprolol succinate XL (TOPROL-XL) 200 MG 24 hr tablet Take 1 tablet by mouth Daily.   1/21/2025    pregabalin (LYRICA) 75 MG capsule Take 1 capsule by mouth 2 (Two) Times a Day. Must schedule and keep appt 60 capsule 0 1/21/2025    anastrozole (ARIMIDEX) 1 MG tablet Take 1 tablet by mouth Daily. (Patient not taking: Reported on 12/10/2024)          Allergies:  No Known Allergies     Vitals: Temp:  [98.1 °F (36.7 °C)] 98.1 °F (36.7 °C)  Heart Rate:  [76] 76  Resp:  [18] 18  BP: (147)/(105) 147/105    Review Of Systems:  Constitutional:  Negative for chills, fever, and unexpected weight change.  Respiratory:  Negative for cough, chest tightness, shortness of breath, and wheezing.  Cardiovascular:  Negative for chest pain, palpitations, and leg swelling.  Gastrointestinal:  Negative for abdominal distention, abdominal pain, nausea, vomiting.  Neurological:  Negative for weakness, numbness, and headaches.     Physical Exam:    General Appearance:  Alert, cooperative, in no acute distress.   Lungs:   Clear to auscultation, respirations regular, even and                 unlabored.   Heart:  Regular rhythm and normal rate.   Abdomen:   Normal bowel sounds, no masses, no organomegaly. Soft, nontender, nondistended   Neurologic: Alert and oriented x 3. Moves all four limbs equally       Assessment & Plan     Assessment:  Active Problems:    Anemia    Cirrhosis of liver without ascites      Plan: Esophagogastroduodenoscopy with possible biopsy, polypectomy, dilatation, endoscopic band ligation, ablation of arteriovenous malformations or control of bleeding (N/A)     Brittany Sandoval MD  1/22/2025

## 2025-01-23 LAB — REF LAB TEST METHOD: NORMAL

## 2025-02-07 RX ORDER — ATORVASTATIN CALCIUM 20 MG/1
20 TABLET, FILM COATED ORAL DAILY
COMMUNITY
End: 2025-02-07 | Stop reason: SDUPTHER

## 2025-02-07 NOTE — TELEPHONE ENCOUNTER
Rx Refill Note  Requested Prescriptions     Pending Prescriptions Disp Refills    lisinopril (PRINIVIL,ZESTRIL) 30 MG tablet 90 tablet 1     Sig: Take 1 tablet by mouth Daily.    furosemide (LASIX) 40 MG tablet 90 tablet 0     Sig: Take 1 tablet by mouth As Needed (prn).    apixaban (ELIQUIS) 5 MG tablet tablet 180 tablet 1     Sig: Take 1 tablet by mouth Every 12 (Twelve) Hours.    atorvastatin (LIPITOR) 20 MG tablet 90 tablet 1     Sig: Take 1 tablet by mouth Daily.      Last office visit with prescribing clinician: Visit date not found   Last telemedicine visit with prescribing clinician: Visit date not found   Next office visit with prescribing clinician: Visit date not found                        Would you like a call back once the refill request has been completed: [] Yes [] No [] N/A    If the office needs to give you a call back, can they leave a voicemail: [] Yes [] No [] N/A    Pharmacy Info    Last Fill Date  Rx Written Date:   Prescribed Qty:   Additional Details from Pharmacy:       Adeola Smith MA  02/07/25, 16:54 EST

## 2025-02-10 DIAGNOSIS — I48.91 ATRIAL FIBRILLATION, UNSPECIFIED TYPE: ICD-10-CM

## 2025-02-10 DIAGNOSIS — E78.5 HYPERLIPIDEMIA LDL GOAL <100: Primary | ICD-10-CM

## 2025-02-10 DIAGNOSIS — I10 ESSENTIAL HYPERTENSION: ICD-10-CM

## 2025-02-10 RX ORDER — ATORVASTATIN CALCIUM 20 MG/1
20 TABLET, FILM COATED ORAL DAILY
Qty: 90 TABLET | Refills: 1 | Status: SHIPPED | OUTPATIENT
Start: 2025-02-10 | End: 2025-02-11 | Stop reason: SDUPTHER

## 2025-02-10 RX ORDER — ATORVASTATIN CALCIUM 20 MG/1
20 TABLET, FILM COATED ORAL DAILY
Qty: 90 TABLET | Refills: 1 | Status: SHIPPED | OUTPATIENT
Start: 2025-02-10 | End: 2025-02-10 | Stop reason: SDUPTHER

## 2025-02-10 RX ORDER — LISINOPRIL 30 MG/1
30 TABLET ORAL DAILY
Qty: 90 TABLET | Refills: 1 | Status: SHIPPED | OUTPATIENT
Start: 2025-02-10 | End: 2025-02-11 | Stop reason: SDUPTHER

## 2025-02-10 RX ORDER — FUROSEMIDE 40 MG/1
40 TABLET ORAL AS NEEDED
Qty: 90 TABLET | Refills: 0 | Status: SHIPPED | OUTPATIENT
Start: 2025-02-10 | End: 2025-02-11 | Stop reason: SDUPTHER

## 2025-02-10 RX ORDER — LISINOPRIL 30 MG/1
30 TABLET ORAL DAILY
Qty: 90 TABLET | Refills: 1 | Status: SHIPPED | OUTPATIENT
Start: 2025-02-10 | End: 2025-02-10 | Stop reason: SDUPTHER

## 2025-02-11 DIAGNOSIS — I10 ESSENTIAL HYPERTENSION: ICD-10-CM

## 2025-02-11 DIAGNOSIS — I10 HYPERTENSION, UNSPECIFIED TYPE: Primary | ICD-10-CM

## 2025-02-11 DIAGNOSIS — E78.5 HYPERLIPIDEMIA LDL GOAL <100: ICD-10-CM

## 2025-02-11 RX ORDER — ATORVASTATIN CALCIUM 20 MG/1
20 TABLET, FILM COATED ORAL DAILY
Qty: 90 TABLET | Refills: 1 | Status: SHIPPED | OUTPATIENT
Start: 2025-02-11

## 2025-02-11 RX ORDER — LISINOPRIL 30 MG/1
30 TABLET ORAL DAILY
Qty: 90 TABLET | Refills: 1 | Status: SHIPPED | OUTPATIENT
Start: 2025-02-11

## 2025-02-11 RX ORDER — FUROSEMIDE 40 MG/1
40 TABLET ORAL AS NEEDED
Qty: 90 TABLET | Refills: 0 | Status: SHIPPED | OUTPATIENT
Start: 2025-02-11 | End: 2025-02-12 | Stop reason: SDUPTHER

## 2025-02-12 RX ORDER — FUROSEMIDE 40 MG/1
40 TABLET ORAL DAILY
Qty: 90 TABLET | Refills: 0 | Status: SHIPPED | OUTPATIENT
Start: 2025-02-12

## 2025-02-24 PROBLEM — I48.0 PAROXYSMAL ATRIAL FIBRILLATION: Status: ACTIVE | Noted: 2025-02-24

## 2025-02-24 PROBLEM — I87.2 CHRONIC VENOUS INSUFFICIENCY: Status: ACTIVE | Noted: 2025-02-24

## 2025-02-24 PROBLEM — I10 BENIGN ESSENTIAL HYPERTENSION: Status: ACTIVE | Noted: 2025-02-24

## 2025-02-24 NOTE — PROGRESS NOTES
Cardiology Follow-Up Note     Name: Neal Lopez  :   1970  PCP: Emmy Aguilar, APRN  Date:   2025  Department: [facility] CARDIOLOGY  [address]  [facility] [address]  Fax [phone]  Phone [phone]    Chief Complaint   Patient presents with    Hypertension    Hyperlipidemia     Subjective     History of Present Illness  Neal Lopez is a 54 y.o. male who presents today for routine follow-up of chronic conditions.  Patient has a past medical history of chronic venous insufficiency s/p ablation, hypertension, hyperlipidemia, CKD2, perm A-fib.  The patient was recently diagnosed with cirrhosis with ascites.    Venous duplex 2024: Bilateral GSV and left SSV remain occluded following recent catheter closure  Labs 2024: GFR 66, LDL 73  Echo 2024: EF 56%, severe LVH, mild MR, mild TR, A-fib noted  DCCV successful 2017    Current Outpatient Medications:     Allopurinol 200 MG tablet, Take 200 mg by mouth Daily., Disp: , Rfl:     apixaban (ELIQUIS) 5 MG tablet tablet, Take 1 tablet by mouth Every 12 (Twelve) Hours., Disp: 180 tablet, Rfl: 1    atorvastatin (LIPITOR) 20 MG tablet, Take 1 tablet by mouth Daily., Disp: 90 tablet, Rfl: 1    furosemide (LASIX) 40 MG tablet, Take 1 tablet by mouth Daily. (Patient taking differently: Take 1 tablet by mouth Daily As Needed.), Disp: 90 tablet, Rfl: 0    lisinopril (PRINIVIL,ZESTRIL) 30 MG tablet, Take 1 tablet by mouth Daily., Disp: 90 tablet, Rfl: 1    metoprolol succinate XL (TOPROL-XL) 200 MG 24 hr tablet, Take 1 tablet by mouth Daily., Disp: , Rfl:     pantoprazole (PROTONIX) 40 MG EC tablet, Take 1 tablet by mouth Daily. Indications: Esophagus Inflammation with Erosion, Disp: 30 tablet, Rfl: 2    pregabalin (LYRICA) 75 MG capsule, Take 1 capsule by mouth 2 (Two) Times a Day. Must schedule and keep appt, Disp: 60 capsule, Rfl: 0    Testosterone Cypionate  "(DEPOTESTOTERONE CYPIONATE) 200 MG/ML injection, Inject 1 mL into the appropriate muscle as directed by prescriber Every 14 (Fourteen) Days., Disp: , Rfl:     albuterol sulfate  (90 Base) MCG/ACT inhaler, Inhale 1 puff Every 4 (Four) Hours As Needed., Disp: , Rfl:     anastrozole (ARIMIDEX) 1 MG tablet, Take 1 tablet by mouth Daily. (Patient not taking: Reported on 12/10/2024), Disp: , Rfl:     fluticasone (FLONASE) 50 MCG/ACT nasal spray, 1 spray by Each Nare route As Needed., Disp: , Rfl:     Objective     Vital Signs:  /88 (BP Location: Left arm, Patient Position: Sitting)   Pulse 72   Ht 182.9 cm (72\")   Wt 134 kg (294 lb 8 oz)   BMI 39.94 kg/m²   Estimated body mass index is 39.94 kg/m² as calculated from the following:    Height as of this encounter: 182.9 cm (72\").    Weight as of this encounter: 134 kg (294 lb 8 oz).               Vitals reviewed.   Constitutional:       Appearance: Normal and healthy appearance.   Eyes:      Pupils: Pupils are equal, round, and reactive to light.   Pulmonary:      Effort: Pulmonary effort is normal.   Chest:      Chest wall: Not tender to palpatation.   Cardiovascular:      PMI at left midclavicular line. Normal rate. Regular rhythm.      No gallop.    Pulses:     Intact distal pulses.   Edema:     Peripheral edema absent.   Skin:     General: Skin is warm.   Psychiatric:         Behavior: Behavior is cooperative.              Data Review:   Lab Results   Component Value Date    GLUCOSE 95 09/17/2024    BUN 20 09/17/2024    CREATININE 1.50 (H) 09/17/2024    EGFRIFNONA 65 06/03/2020    EGFRIFAFRI 71 11/25/2024    BCR 13.3 09/17/2024    K 3.5 09/17/2024    CO2 29.6 (H) 09/17/2024    CALCIUM 9.1 09/17/2024    ALBUMIN 4.0 09/17/2024    AST 30 09/17/2024    ALT 22 09/17/2024     Lab Results   Component Value Date    TRIG 82 09/17/2024      Lab Results   Component Value Date    WBC 5.32 09/17/2024    RBC 5.39 09/17/2024    HGB 13.5 09/17/2024    HCT 43.7 " "11/25/2024    MCV 82.4 09/17/2024     09/17/2024     No results found for: \"TSH\"  No results found for: \"HGBA1C\"  Lab Results   Component Value Date    INR 1.47 (H) 09/17/2024    INR 1.29 (H) 07/15/2024    PROTIME 18.4 (H) 09/17/2024    PROTIME 16.7 (H) 07/15/2024           Assessment and Plan     Assessment & Plan  Paroxysmal atrial fibrillation  The patient EDQ7ND3-VBVo score is 1.  He was given the option of staying on Eliquis, the patient is on Eliquis 5 mg p.o. twice daily.  The patient was diagnosed with cirrhosis of the liver.  Will discuss about oral anticoagulant and his bleeding risks with cirrhosis should be discussed with GI specialist.  The patient can go off Eliquis as his Floyd Vascor is 1.  The patient will continue Eliquis 5 mg p.o. twice daily, he has mild liver issues, he has checked with the GI specialist.  We have discussed pros and cons of the medicine patient wants to continue Eliquis.       Chronic venous insufficiency  The patient has chronic venous insufficiency and is advised to wear compression socks, leg elevation exercise and weight reduction.       Benign essential hypertension  Patient will continue lisinopril 30 mg once a day metoprolol succinate 200 mg daily.           Advised to continue current cardiac medications. Please notify of any issues. Discussed with the patient compliance with medical management and follow-up.     Follow Up  Return in about 6 months (around 8/25/2025).    Call if you have any significant symptoms or go to the [facility] [place], operated by [facility] Emergency room if possible.     Quentin Foster MD, FACC,[facility].  [facility]    Part of this note may be an electronic transcription/translation of spoken language to printed text using the Dragon Dictation System.  "

## 2025-02-25 ENCOUNTER — OFFICE VISIT (OUTPATIENT)
Age: 55
End: 2025-02-25
Payer: COMMERCIAL

## 2025-02-25 VITALS
BODY MASS INDEX: 39.89 KG/M2 | WEIGHT: 294.5 LBS | HEART RATE: 72 BPM | SYSTOLIC BLOOD PRESSURE: 135 MMHG | HEIGHT: 72 IN | DIASTOLIC BLOOD PRESSURE: 88 MMHG

## 2025-02-25 DIAGNOSIS — I87.2 CHRONIC VENOUS INSUFFICIENCY: ICD-10-CM

## 2025-02-25 DIAGNOSIS — I10 BENIGN ESSENTIAL HYPERTENSION: ICD-10-CM

## 2025-02-25 DIAGNOSIS — I48.0 PAROXYSMAL ATRIAL FIBRILLATION: Primary | ICD-10-CM

## 2025-02-25 RX ORDER — TESTOSTERONE CYPIONATE 200 MG/ML
200 INJECTION, SOLUTION INTRAMUSCULAR
COMMUNITY
Start: 2025-02-06

## 2025-02-25 NOTE — ASSESSMENT & PLAN NOTE
The patient IZQ1AP0-EGFe score is 1.  He was given the option of staying on Eliquis, the patient is on Eliquis 5 mg p.o. twice daily.  The patient was diagnosed with cirrhosis of the liver.  Will discuss about oral anticoagulant and his bleeding risks with cirrhosis should be discussed with GI specialist.  The patient can go off Eliquis as his Floyd Vascor is 1.  The patient will continue Eliquis 5 mg p.o. twice daily, he has mild liver issues, he has checked with the GI specialist.  We have discussed pros and cons of the medicine patient wants to continue Eliquis.

## 2025-02-25 NOTE — ASSESSMENT & PLAN NOTE
The patient has chronic venous insufficiency and is advised to wear compression socks, leg elevation exercise and weight reduction.

## 2025-03-25 ENCOUNTER — TELEPHONE (OUTPATIENT)
Dept: GASTROENTEROLOGY | Facility: CLINIC | Age: 55
End: 2025-03-25
Payer: COMMERCIAL

## 2025-03-25 RX ORDER — PANTOPRAZOLE SODIUM 40 MG/1
40 TABLET, DELAYED RELEASE ORAL DAILY
Qty: 90 TABLET | Refills: 3 | Status: SHIPPED | OUTPATIENT
Start: 2025-03-25

## 2025-03-25 NOTE — TELEPHONE ENCOUNTER
Rx sent    ----- Message -----  From: Gianna Lawson MA  Sent: 3/25/2025   9:15 AM EDT  To: Mee Leung PA-C    Last ov 12/2024 with Dr. Shetty request for refill for Pantoprazole 40 mg 1 daily, #90 to [facility].

## 2025-04-10 ENCOUNTER — LAB (OUTPATIENT)
Dept: LAB | Facility: HOSPITAL | Age: 55
End: 2025-04-10
Payer: COMMERCIAL

## 2025-04-10 ENCOUNTER — TRANSCRIBE ORDERS (OUTPATIENT)
Dept: LAB | Facility: HOSPITAL | Age: 55
End: 2025-04-10
Payer: COMMERCIAL

## 2025-04-10 ENCOUNTER — HOSPITAL ENCOUNTER (OUTPATIENT)
Dept: GENERAL RADIOLOGY | Facility: HOSPITAL | Age: 55
Discharge: HOME | End: 2025-04-10
Payer: COMMERCIAL

## 2025-04-10 DIAGNOSIS — M54.50 LOW BACK PAIN, UNSPECIFIED BACK PAIN LATERALITY, UNSPECIFIED CHRONICITY, UNSPECIFIED WHETHER SCIATICA PRESENT: ICD-10-CM

## 2025-04-10 DIAGNOSIS — M54.50 LOW BACK PAIN, UNSPECIFIED BACK PAIN LATERALITY, UNSPECIFIED CHRONICITY, UNSPECIFIED WHETHER SCIATICA PRESENT: Primary | ICD-10-CM

## 2025-04-10 DIAGNOSIS — R05.9 COUGH, UNSPECIFIED TYPE: ICD-10-CM

## 2025-04-10 DIAGNOSIS — I87.2 CHRONIC VENOUS INSUFFICIENCY: ICD-10-CM

## 2025-04-10 DIAGNOSIS — I48.0 PAROXYSMAL ATRIAL FIBRILLATION: ICD-10-CM

## 2025-04-10 DIAGNOSIS — K74.60 CIRRHOSIS OF LIVER WITHOUT ASCITES, UNSPECIFIED HEPATIC CIRRHOSIS TYPE: Chronic | ICD-10-CM

## 2025-04-10 DIAGNOSIS — K75.81 METABOLIC DYSFUNCTION-ASSOCIATED STEATOHEPATITIS (MASH): Chronic | ICD-10-CM

## 2025-04-10 DIAGNOSIS — I10 BENIGN ESSENTIAL HYPERTENSION: ICD-10-CM

## 2025-04-10 LAB
ALBUMIN SERPL-MCNC: 3.7 G/DL (ref 3.5–5.2)
ALBUMIN/GLOB SERPL: 1.1 G/DL
ALP SERPL-CCNC: 190 U/L (ref 39–117)
ALT SERPL W P-5'-P-CCNC: 9 U/L (ref 1–41)
ANION GAP SERPL CALCULATED.3IONS-SCNC: 11 MMOL/L (ref 5–15)
AST SERPL-CCNC: 15 U/L (ref 1–40)
BASOPHILS # BLD AUTO: 0.12 10*3/MM3 (ref 0–0.2)
BASOPHILS NFR BLD AUTO: 1.6 % (ref 0–1.5)
BILIRUB SERPL-MCNC: 1 MG/DL (ref 0–1.2)
BUN SERPL-MCNC: 12 MG/DL (ref 6–20)
BUN/CREAT SERPL: 11.4 (ref 7–25)
CALCIUM SPEC-SCNC: 9 MG/DL (ref 8.6–10.5)
CHLORIDE SERPL-SCNC: 99 MMOL/L (ref 98–107)
CHOLEST SERPL-MCNC: 102 MG/DL (ref 0–200)
CO2 SERPL-SCNC: 29 MMOL/L (ref 22–29)
CREAT SERPL-MCNC: 1.05 MG/DL (ref 0.76–1.27)
DEPRECATED RDW RBC AUTO: 40.5 FL (ref 37–54)
EGFRCR SERPLBLD CKD-EPI 2021: 83.3 ML/MIN/1.73
EOSINOPHIL # BLD AUTO: 0.58 10*3/MM3 (ref 0–0.4)
EOSINOPHIL NFR BLD AUTO: 7.9 % (ref 0.3–6.2)
ERYTHROCYTE [DISTWIDTH] IN BLOOD BY AUTOMATED COUNT: 14.2 % (ref 12.3–15.4)
GLOBULIN UR ELPH-MCNC: 3.4 GM/DL
GLUCOSE SERPL-MCNC: 87 MG/DL (ref 65–99)
HCT VFR BLD AUTO: 40.7 % (ref 37.5–51)
HDLC SERPL-MCNC: 19 MG/DL (ref 40–60)
HGB BLD-MCNC: 12.5 G/DL (ref 13–17.7)
IMM GRANULOCYTES # BLD AUTO: 0.03 10*3/MM3 (ref 0–0.05)
IMM GRANULOCYTES NFR BLD AUTO: 0.4 % (ref 0–0.5)
INR PPP: 1.11 (ref 0.9–1.1)
LDLC SERPL CALC-MCNC: 68 MG/DL (ref 0–100)
LDLC/HDLC SERPL: 3.63 {RATIO}
LYMPHOCYTES # BLD AUTO: 1.29 10*3/MM3 (ref 0.7–3.1)
LYMPHOCYTES NFR BLD AUTO: 17.5 % (ref 19.6–45.3)
MCH RBC QN AUTO: 24.3 PG (ref 26.6–33)
MCHC RBC AUTO-ENTMCNC: 30.7 G/DL (ref 31.5–35.7)
MCV RBC AUTO: 79 FL (ref 79–97)
MONOCYTES # BLD AUTO: 0.67 10*3/MM3 (ref 0.1–0.9)
MONOCYTES NFR BLD AUTO: 9.1 % (ref 5–12)
NEUTROPHILS NFR BLD AUTO: 4.69 10*3/MM3 (ref 1.7–7)
NEUTROPHILS NFR BLD AUTO: 63.5 % (ref 42.7–76)
NRBC BLD AUTO-RTO: 0 /100 WBC (ref 0–0.2)
PLATELET # BLD AUTO: 352 10*3/MM3 (ref 140–450)
PMV BLD AUTO: 10.7 FL (ref 6–12)
POTASSIUM SERPL-SCNC: 4.1 MMOL/L (ref 3.5–5.2)
PROT SERPL-MCNC: 7.1 G/DL (ref 6–8.5)
PROTHROMBIN TIME: 14.9 SECONDS (ref 12.3–15.1)
RBC # BLD AUTO: 5.15 10*6/MM3 (ref 4.14–5.8)
SODIUM SERPL-SCNC: 139 MMOL/L (ref 136–145)
TRIGL SERPL-MCNC: 70 MG/DL (ref 0–150)
VLDLC SERPL-MCNC: 15 MG/DL (ref 5–40)
WBC NRBC COR # BLD AUTO: 7.38 10*3/MM3 (ref 3.4–10.8)

## 2025-04-10 PROCEDURE — 72100 X-RAY EXAM L-S SPINE 2/3 VWS: CPT

## 2025-04-10 PROCEDURE — 80061 LIPID PANEL: CPT

## 2025-04-10 PROCEDURE — 36415 COLL VENOUS BLD VENIPUNCTURE: CPT

## 2025-04-10 PROCEDURE — 85610 PROTHROMBIN TIME: CPT

## 2025-04-10 PROCEDURE — 71046 X-RAY EXAM CHEST 2 VIEWS: CPT

## 2025-04-10 PROCEDURE — 80053 COMPREHEN METABOLIC PANEL: CPT

## 2025-04-10 PROCEDURE — 85025 COMPLETE CBC W/AUTO DIFF WBC: CPT

## 2025-04-10 NOTE — PROGRESS NOTES
Cardiology Follow-Up Note     Name: Neal Lopez  :   1970  PCP: Emmy Aguilar, APRN  Date:   2025  Department: [facility] CARDIOLOGY  [address]  [facility] [address]  Fax [phone]  Phone [phone]    Chief Complaint   Patient presents with    Atrial Fibrillation    Hypertension    Shortness of Breath     Subjective     History of Present Illness  Neal Lopez is a 54 y.o. male who presents today for routine follow-up of chronic conditions.  Patient has a past medical history of chronic venous insufficiency s/p ablation, hypertension, hyperlipidemia, CKD2, perm A-fib.  The patient was recently diagnosed with cirrhosis with ascites. Patient states has shortness of breath with chest xray showing fluids in lungs.    Venous duplex 2024: Bilateral GSV and left SSV remain occluded following recent catheter closure  Labs 2024: GFR 66, LDL 73  Echo 2024: EF 56%, severe LVH, mild MR, mild TR, A-fib noted  DCCV successful 2017    Current Outpatient Medications:     Allopurinol 200 MG tablet, Take 200 mg by mouth Daily., Disp: , Rfl:     apixaban (ELIQUIS) 5 MG tablet tablet, Take 1 tablet by mouth Every 12 (Twelve) Hours., Disp: 180 tablet, Rfl: 1    atorvastatin (LIPITOR) 20 MG tablet, Take 1 tablet by mouth Daily., Disp: 90 tablet, Rfl: 1    furosemide (LASIX) 40 MG tablet, Take 1 tablet by mouth Daily. (Patient taking differently: Take 1 tablet by mouth 2 (Two) Times a Day.), Disp: 90 tablet, Rfl: 0    lisinopril (PRINIVIL,ZESTRIL) 30 MG tablet, Take 1 tablet by mouth Daily., Disp: 90 tablet, Rfl: 1    metoprolol succinate XL (TOPROL-XL) 200 MG 24 hr tablet, Take 1 tablet by mouth Daily., Disp: , Rfl:     pantoprazole (PROTONIX) 40 MG EC tablet, Take 1 tablet by mouth Daily. Indications: Esophagus Inflammation with Erosion, Disp: 90 tablet, Rfl: 3    pregabalin (LYRICA) 75 MG capsule, Take 1 capsule by  "mouth 2 (Two) Times a Day. Must schedule and keep appt, Disp: 60 capsule, Rfl: 0    Testosterone Cypionate (DEPOTESTOTERONE CYPIONATE) 200 MG/ML injection, Inject 1 mL into the appropriate muscle as directed by prescriber Every 14 (Fourteen) Days., Disp: , Rfl:     Objective     Vital Signs:  /85 (BP Location: Left arm, Patient Position: Sitting)   Pulse 70   Ht 182.9 cm (72\")   Wt (!) 137 kg (301 lb)   BMI 40.82 kg/m²   Estimated body mass index is 40.82 kg/m² as calculated from the following:    Height as of this encounter: 182.9 cm (72\").    Weight as of this encounter: 137 kg (301 lb).               Vitals reviewed.   Constitutional:       Appearance: Normal and healthy appearance.   Eyes:      Pupils: Pupils are equal, round, and reactive to light.   Pulmonary:      Effort: Pulmonary effort is normal.   Chest:      Chest wall: Not tender to palpatation.   Cardiovascular:      PMI at left midclavicular line. Normal rate. Regular rhythm.      No gallop.    Pulses:     Intact distal pulses.   Edema:     Peripheral edema absent.   Skin:     General: Skin is warm.   Psychiatric:         Behavior: Behavior is cooperative.              Data Review:   Lab Results   Component Value Date    GLUCOSE 87 04/10/2025    BUN 12 04/10/2025    CREATININE 1.05 04/10/2025    EGFRIFNONA 65 06/03/2020    EGFRIFAFRI 71 11/25/2024    BCR 11.4 04/10/2025    K 4.1 04/10/2025    CO2 29.0 04/10/2025    CALCIUM 9.0 04/10/2025    ALBUMIN 3.7 04/10/2025    AST 15 04/10/2025    ALT 9 04/10/2025     Lab Results   Component Value Date    CHOL 102 04/10/2025    TRIG 70 04/10/2025    HDL 19 (L) 04/10/2025    LDL 68 04/10/2025      Lab Results   Component Value Date    WBC 7.38 04/10/2025    RBC 5.15 04/10/2025    HGB 12.5 (L) 04/10/2025    HCT 40.7 04/10/2025    MCV 79.0 04/10/2025     04/10/2025     No results found for: \"TSH\"  No results found for: \"HGBA1C\"  Lab Results   Component Value Date    INR 1.11 (H) 04/10/2025    INR " 1.47 (H) 09/17/2024    INR 1.29 (H) 07/15/2024    PROTIME 14.9 04/10/2025    PROTIME 18.4 (H) 09/17/2024    PROTIME 16.7 (H) 07/15/2024           Assessment and Plan     Assessment & Plan  Paroxysmal atrial fibrillation  The patient BUR5OU4-RWSs score is 1.  He was given the option of staying on Eliquis, the patient is on Eliquis 5 mg p.o. twice daily.  The patient was diagnosed with cirrhosis of the liver.  Will discuss about oral anticoagulant and his bleeding risks with cirrhosis should be discussed with GI specialist.  The patient can go off Eliquis as his Floyd Vascor is 1.  The patient will continue Eliquis 5 mg p.o. twice daily, he has mild liver issues, he has checked with the GI specialist.  We have discussed pros and cons of the medicine patient wants to continue Eliquis.    Orders:    Adult Transthoracic Echo Complete W/ Cont if Necessary Per Protocol; Future     Benign essential hypertension  Patient will continue lisinopril 30 mg once a day metoprolol succinate 200 mg daily.      Orders:    Adult Transthoracic Echo Complete W/ Cont if Necessary Per Protocol; Future     Chronic venous insufficiency  The patient has chronic venous insufficiency and is advised to wear compression socks, leg elevation exercise and weight reduction.    Orders:    Adult Transthoracic Echo Complete W/ Cont if Necessary Per Protocol; Future       Advised to continue current cardiac medications. Please notify of any issues. Discussed with the patient compliance with medical management and follow-up.     Follow Up  Return for Follow-up post-testing.    Call if you have any significant symptoms or go to the [facility] Emergency room if possible.     Quentin Foster MD, FACC,[facility].  [place] [facility]    Part of this note may be an electronic transcription/translation of spoken language to printed text using the Dragon Dictation System.

## 2025-04-10 NOTE — ASSESSMENT & PLAN NOTE
The patient has chronic venous insufficiency and is advised to wear compression socks, leg elevation exercise and weight reduction.    Orders:    Adult Transthoracic Echo Complete W/ Cont if Necessary Per Protocol; Future

## 2025-04-10 NOTE — ASSESSMENT & PLAN NOTE
Patient will continue lisinopril 30 mg once a day metoprolol succinate 200 mg daily.      Orders:    Adult Transthoracic Echo Complete W/ Cont if Necessary Per Protocol; Future

## 2025-04-11 ENCOUNTER — OFFICE VISIT (OUTPATIENT)
Age: 55
End: 2025-04-11
Payer: COMMERCIAL

## 2025-04-11 VITALS
DIASTOLIC BLOOD PRESSURE: 85 MMHG | BODY MASS INDEX: 40.77 KG/M2 | HEIGHT: 72 IN | SYSTOLIC BLOOD PRESSURE: 130 MMHG | WEIGHT: 301 LBS | HEART RATE: 70 BPM

## 2025-04-11 DIAGNOSIS — I48.0 PAROXYSMAL ATRIAL FIBRILLATION: Primary | ICD-10-CM

## 2025-04-11 DIAGNOSIS — I87.2 CHRONIC VENOUS INSUFFICIENCY: ICD-10-CM

## 2025-04-11 DIAGNOSIS — I10 BENIGN ESSENTIAL HYPERTENSION: ICD-10-CM

## 2025-04-22 ENCOUNTER — PATIENT ROUNDING (BHMG ONLY) (OUTPATIENT)
Dept: PULMONOLOGY | Facility: CLINIC | Age: 55
End: 2025-04-22
Payer: COMMERCIAL

## 2025-04-22 ENCOUNTER — OFFICE VISIT (OUTPATIENT)
Dept: PULMONOLOGY | Facility: CLINIC | Age: 55
End: 2025-04-22
Payer: COMMERCIAL

## 2025-04-22 VITALS
DIASTOLIC BLOOD PRESSURE: 76 MMHG | BODY MASS INDEX: 39.98 KG/M2 | RESPIRATION RATE: 18 BRPM | OXYGEN SATURATION: 95 % | SYSTOLIC BLOOD PRESSURE: 132 MMHG | HEART RATE: 68 BPM | WEIGHT: 295.2 LBS | HEIGHT: 72 IN

## 2025-04-22 DIAGNOSIS — G47.33 OBSTRUCTIVE SLEEP APNEA: Primary | ICD-10-CM

## 2025-04-22 PROCEDURE — 99244 OFF/OP CNSLTJ NEW/EST MOD 40: CPT | Performed by: INTERNAL MEDICINE

## 2025-04-22 NOTE — PROGRESS NOTES
CONSULT NOTE    Requested by:   Emmy Aguilar APRN Sowden, Kimberly H, APRN      Chief Complaint   Patient presents with    Sleeping Problem    Consult       Subjective:  Neal Lopez is a 55 y.o. male.   Patient comes in today for consultation because of sleep apnea.  The patient says that  he was diagnosed with sleep apnea 20 years ago.      It appears that he has been on a PAP device since then.     he feels that the PAP device occasionally does not function properly.     Patient does report some initial improvement but now feels that he is once again feeling tired in the morning and occasionally has noticed excessive daytime sleepiness as well.      Patient is aware of snoring through the device.     The patient describes no significant issues with his mask either.     he does have a family history of ELDA, in his son.    Patient is under management for hypertension    The following portions of the patient's history were reviewed and updated as appropriate: allergies, current medications, past family history, past medical history, past social history, and past surgical history.    Review of Systems   HENT:  Positive for sneezing. Negative for sinus pressure and sore throat.    Respiratory:  Positive for cough, chest tightness, shortness of breath and wheezing.    Psychiatric/Behavioral:  Negative for sleep disturbance.    All other systems reviewed and are negative.      Past Medical History:   Diagnosis Date    A-fib     Anemia 09/17/2024    Arthritis     Cervical disc disorder     Chronic kidney disease     Cirrhosis 2024    Claustrophobia     Coronary artery disease     Edema     Elevated cholesterol     Fatty liver 2024    Gout     Hyperlipidemia     Hypertension     Irregular heart rhythm     Liver disease     Low back pain     Lumbosacral disc disease     Mitral regurgitation     MILD BY ECHO    Morbid obesity     Peripheral neuropathy     PONV (postoperative nausea and vomiting)     Pulmonary  "arterial hypertension     Sleep apnea     CPAP    Thoracic disc disorder     Tricuspid regurgitation     MILD BY ECHO       Social History     Tobacco Use    Smoking status: Never     Passive exposure: Never    Smokeless tobacco: Never   Substance Use Topics    Alcohol use: Never         Objective:  Visit Vitals  /76   Pulse 68   Resp 18   Ht 182.9 cm (72.01\")   Wt 134 kg (295 lb 3.2 oz)   SpO2 95%   BMI 40.03 kg/m²       BMI Readings from Last 8 Encounters:   04/22/25 40.03 kg/m²   04/11/25 40.82 kg/m²   02/25/25 39.94 kg/m²   01/17/25 39.33 kg/m²   12/10/24 41.37 kg/m²   10/03/24 40.42 kg/m²   09/17/24 43.25 kg/m²   07/23/24 41.56 kg/m²       Physical Exam  Vitals reviewed.   Constitutional:       Appearance: He is well-developed.   HENT:      Head: Atraumatic.      Mouth/Throat:      Mouth: Mucous membranes are moist.      Comments: Oropharynx was crowded.  Eyes:      Pupils: Pupils are equal, round, and reactive to light.   Neck:      Thyroid: No thyromegaly.      Vascular: No JVD.      Trachea: No tracheal deviation.   Cardiovascular:      Rate and Rhythm: Normal rate and regular rhythm.   Pulmonary:      Effort: Pulmonary effort is normal. No respiratory distress.      Breath sounds: Normal breath sounds. No wheezing.   Musculoskeletal:      Right lower leg: No edema.      Left lower leg: No edema.      Comments: Gait was normal.   Skin:     General: Skin is warm and dry.   Neurological:      Mental Status: He is alert and oriented to person, place, and time.           Assessment/Plan:  Diagnoses and all orders for this visit:    1. Obstructive sleep apnea (Primary)  -     Polysomnography 4 or More Parameters; Future    2. BMI 40.0-44.9, adult  -     Polysomnography 4 or More Parameters; Future        Return in about 4 months (around 8/29/2025) for [facility].    DISCUSSION(if any):  Laboratory workup also showed   Lab Results   Component Value Date    HGB 12.5 (L) 04/10/2025    HGB 13.5 09/17/2024 "    HGB 11.6 (L) 07/15/2024   ,   Lab Results   Component Value Date    HCT 40.7 04/10/2025    HCT 43.7 11/25/2024    HCT 44.4 09/17/2024       Lab Results   Component Value Date    EOSABS 0.58 (H) 04/10/2025    EOSABS 0.07 07/15/2024    EOSABS 0.04 04/05/2024    &  Lab Results   Component Value Date    AFPTM 193 09/17/2024   ,  Laboratory workup also showed   Lab Results   Component Value Date    CO2 29.0 04/10/2025    CO2 29.6 (H) 09/17/2024    CO2 27.4 07/15/2024     ===========================  ===========================    We will try to obtain his last sleep study results from the performing facility, if not already scanned in our system.     I told the patient that his symptoms are consistent with somewhat poorly controlled sleep apnea.    Patient was advised to continue using PAP for at least 4 hours every night.    Since his last sleep study was more than 10 years ago, the best option would be to proceed with a split-night study.    Patient was advised to call this office with any issues.    Weight loss was also discussed and advised.        Dictated utilizing Dragon dictation.    This document was electronically signed by Stephanie Sherwood MD on 04/22/25 at 15:29 EDT

## 2025-04-24 ENCOUNTER — HOSPITAL ENCOUNTER (EMERGENCY)
Facility: HOSPITAL | Age: 55
Discharge: HOME | End: 2025-04-24
Attending: FAMILY MEDICINE
Payer: COMMERCIAL

## 2025-04-24 ENCOUNTER — APPOINTMENT (OUTPATIENT)
Facility: HOSPITAL | Age: 55
End: 2025-04-24
Payer: COMMERCIAL

## 2025-04-24 VITALS
OXYGEN SATURATION: 100 % | SYSTOLIC BLOOD PRESSURE: 143 MMHG | DIASTOLIC BLOOD PRESSURE: 89 MMHG | WEIGHT: 285 LBS | HEIGHT: 72 IN | BODY MASS INDEX: 38.6 KG/M2 | TEMPERATURE: 98.6 F | RESPIRATION RATE: 16 BRPM | HEART RATE: 71 BPM

## 2025-04-24 DIAGNOSIS — R18.8 OTHER ASCITES: Primary | ICD-10-CM

## 2025-04-24 LAB
ALBUMIN SERPL-MCNC: 3.7 G/DL (ref 3.5–5.2)
ALBUMIN/GLOB SERPL: 1.1 G/DL
ALP SERPL-CCNC: 191 U/L (ref 39–117)
ALT SERPL W P-5'-P-CCNC: 12 U/L (ref 1–41)
ANION GAP SERPL CALCULATED.3IONS-SCNC: 11.3 MMOL/L (ref 5–15)
AST SERPL-CCNC: 31 U/L (ref 1–40)
BACTERIA UR QL AUTO: ABNORMAL /HPF
BASOPHILS # BLD AUTO: 0.01 10*3/MM3 (ref 0–0.2)
BASOPHILS NFR BLD AUTO: 0.2 % (ref 0–1.5)
BILIRUB SERPL-MCNC: 0.8 MG/DL (ref 0–1.2)
BILIRUB UR QL STRIP: NEGATIVE
BUN SERPL-MCNC: 16 MG/DL (ref 6–20)
BUN/CREAT SERPL: 13.4 (ref 7–25)
CALCIUM SPEC-SCNC: 8.7 MG/DL (ref 8.6–10.5)
CHLORIDE SERPL-SCNC: 100 MMOL/L (ref 98–107)
CLARITY UR: CLEAR
CO2 SERPL-SCNC: 27.7 MMOL/L (ref 22–29)
COLOR UR: YELLOW
CREAT SERPL-MCNC: 1.19 MG/DL (ref 0.76–1.27)
D-LACTATE SERPL-SCNC: 1 MMOL/L (ref 0.5–2)
DEPRECATED RDW RBC AUTO: 46.7 FL (ref 37–54)
EGFRCR SERPLBLD CKD-EPI 2021: 71.2 ML/MIN/1.73
EOSINOPHIL # BLD AUTO: 0.23 10*3/MM3 (ref 0–0.4)
EOSINOPHIL NFR BLD AUTO: 3.6 % (ref 0.3–6.2)
ERYTHROCYTE [DISTWIDTH] IN BLOOD BY AUTOMATED COUNT: 16.6 % (ref 12.3–15.4)
GLOBULIN UR ELPH-MCNC: 3.4 GM/DL
GLUCOSE SERPL-MCNC: 90 MG/DL (ref 65–99)
GLUCOSE UR STRIP-MCNC: ABNORMAL MG/DL
HCT VFR BLD AUTO: 41.8 % (ref 37.5–51)
HGB BLD-MCNC: 13.1 G/DL (ref 13–17.7)
HGB UR QL STRIP.AUTO: NEGATIVE
HOLD SPECIMEN: NORMAL
HYALINE CASTS UR QL AUTO: ABNORMAL /LPF
IMM GRANULOCYTES # BLD AUTO: 0.01 10*3/MM3 (ref 0–0.05)
IMM GRANULOCYTES NFR BLD AUTO: 0.2 % (ref 0–0.5)
KETONES UR QL STRIP: ABNORMAL
LEUKOCYTE ESTERASE UR QL STRIP.AUTO: NEGATIVE
LIPASE SERPL-CCNC: 69 U/L (ref 13–60)
LYMPHOCYTES # BLD AUTO: 1.39 10*3/MM3 (ref 0.7–3.1)
LYMPHOCYTES NFR BLD AUTO: 21.8 % (ref 19.6–45.3)
MCH RBC QN AUTO: 24.3 PG (ref 26.6–33)
MCHC RBC AUTO-ENTMCNC: 31.3 G/DL (ref 31.5–35.7)
MCV RBC AUTO: 77.6 FL (ref 79–97)
MONOCYTES # BLD AUTO: 0.68 10*3/MM3 (ref 0.1–0.9)
MONOCYTES NFR BLD AUTO: 10.7 % (ref 5–12)
MUCOUS THREADS URNS QL MICRO: ABNORMAL /HPF
NEUTROPHILS NFR BLD AUTO: 4.05 10*3/MM3 (ref 1.7–7)
NEUTROPHILS NFR BLD AUTO: 63.5 % (ref 42.7–76)
NITRITE UR QL STRIP: NEGATIVE
PH UR STRIP.AUTO: 6 [PH] (ref 5–8)
PLATELET # BLD AUTO: 290 10*3/MM3 (ref 140–450)
PMV BLD AUTO: 10.4 FL (ref 6–12)
POTASSIUM SERPL-SCNC: 3.8 MMOL/L (ref 3.5–5.2)
PROT SERPL-MCNC: 7.1 G/DL (ref 6–8.5)
PROT UR QL STRIP: ABNORMAL
RBC # BLD AUTO: 5.39 10*6/MM3 (ref 4.14–5.8)
RBC # UR STRIP: ABNORMAL /HPF
REF LAB TEST METHOD: ABNORMAL
SODIUM SERPL-SCNC: 139 MMOL/L (ref 136–145)
SP GR UR STRIP: 1.02 (ref 1–1.03)
SQUAMOUS #/AREA URNS HPF: ABNORMAL /HPF
UROBILINOGEN UR QL STRIP: ABNORMAL
WBC # UR STRIP: ABNORMAL /HPF
WBC NRBC COR # BLD AUTO: 6.37 10*3/MM3 (ref 3.4–10.8)
WHOLE BLOOD HOLD COAG: NORMAL
WHOLE BLOOD HOLD SPECIMEN: NORMAL

## 2025-04-24 PROCEDURE — 81001 URINALYSIS AUTO W/SCOPE: CPT | Performed by: FAMILY MEDICINE

## 2025-04-24 PROCEDURE — 83605 ASSAY OF LACTIC ACID: CPT | Performed by: FAMILY MEDICINE

## 2025-04-24 PROCEDURE — 85025 COMPLETE CBC W/AUTO DIFF WBC: CPT | Performed by: FAMILY MEDICINE

## 2025-04-24 PROCEDURE — 99285 EMERGENCY DEPT VISIT HI MDM: CPT | Performed by: FAMILY MEDICINE

## 2025-04-24 PROCEDURE — 80053 COMPREHEN METABOLIC PANEL: CPT | Performed by: FAMILY MEDICINE

## 2025-04-24 PROCEDURE — 25510000001 IOPAMIDOL 61 % SOLUTION: Performed by: FAMILY MEDICINE

## 2025-04-24 PROCEDURE — 74177 CT ABD & PELVIS W/CONTRAST: CPT

## 2025-04-24 PROCEDURE — 83690 ASSAY OF LIPASE: CPT | Performed by: FAMILY MEDICINE

## 2025-04-24 RX ORDER — SODIUM CHLORIDE 9 MG/ML
10 INJECTION, SOLUTION INTRAMUSCULAR; INTRAVENOUS; SUBCUTANEOUS AS NEEDED
Status: DISCONTINUED | OUTPATIENT
Start: 2025-04-24 | End: 2025-04-24 | Stop reason: HOSPADM

## 2025-04-24 RX ORDER — IOPAMIDOL 612 MG/ML
100 INJECTION, SOLUTION INTRAVASCULAR
Status: COMPLETED | OUTPATIENT
Start: 2025-04-24 | End: 2025-04-24

## 2025-04-24 RX ADMIN — IOPAMIDOL 90 ML: 612 INJECTION, SOLUTION INTRAVENOUS at 18:13

## 2025-04-24 NOTE — FSED PROVIDER NOTE
Subjective  History of Present Illness:    History of cirrhosis presenting to the emergency department with abdominal distention and pain.  Patient reports that he has had abdominal distention x 2 weeks, however he has started having pain within the last 24 hours.  Patient states he has a mild increase in discomfort with eating, other than that nothing seems to change his pain.  Patient states that within the last 24 hours he has also developed diarrhea.  He reports that it is intermittent and denies bright red blood per rectum or melena.  Patient states that he had an endoscopy and a colonoscopy in December/January both with normal findings.  Patient denies fevers, vomiting, nausea, urinary symptoms, hematemesis, reflux.  Patient states his last bowel movement was around noon.  He reports he is able to pass gas.  On physical exam patient's abdomen appears moderately distended.  Minimal tenderness noted throughout abdomen.  Patient is afebrile.  Patient denies tobacco, alcohol or drug use.      Nurses Notes reviewed and agree, including vitals, allergies, social history and prior medical history.     REVIEW OF SYSTEMS: All systems reviewed and not pertinent unless noted.  Review of Systems   Gastrointestinal:  Positive for abdominal distention, abdominal pain and diarrhea.   All other systems reviewed and are negative.      Past Medical History:   Diagnosis Date    A-fib     Anemia 09/17/2024    Arthritis     Cervical disc disorder     Chronic kidney disease     Cirrhosis 2024    Claustrophobia     Coronary artery disease     Edema     Elevated cholesterol     Fatty liver 2024    Gout     Hyperlipidemia     Hypertension     Irregular heart rhythm     Liver disease     Low back pain     Lumbosacral disc disease     Mitral regurgitation     MILD BY ECHO    Morbid obesity     Peripheral neuropathy     PONV (postoperative nausea and vomiting)     Pulmonary arterial hypertension     Sleep apnea     CPAP    Thoracic disc  "disorder     Tricuspid regurgitation     MILD BY ECHO       Allergies:    Patient has no known allergies.      Past Surgical History:   Procedure Laterality Date    CARDIAC ABLATION  10/03/2024    COLONOSCOPY N/A 10/07/2024    Procedure: Colonoscopy with biopsy and polypectomy and clipping;  Surgeon: Brittany Sandoval MD;  Location: [facility] ENDOSCOPY;  Service: Gastroenterology;  Laterality: N/A;    ENDOSCOPY N/A 01/22/2025    Procedure: Esophagogastroduodenoscopy with biopsy and polypectomy x1;  Surgeon: Brittany Sandoval MD;  Location: [facility] ENDOSCOPY;  Service: Gastroenterology;  Laterality: N/A;    OTHER SURGICAL HISTORY  12/14/2017    DCCV-SJE/JASMEET/SUCCESSFUL    OTHER SURGICAL HISTORY  04/10/2023    DCCV #2- SJE/LOPEZ/SUCCESSFUL DCCV    THORACIC LAMINECTOMY  2020    Dr. Vital  - EMERGENT    TONSILLECTOMY           Social History     Socioeconomic History    Marital status:    Tobacco Use    Smoking status: Never     Passive exposure: Never    Smokeless tobacco: Never   Vaping Use    Vaping status: Never Used   Substance and Sexual Activity    Alcohol use: Never    Drug use: Never    Sexual activity: Defer         Family History   Problem Relation Age of Onset    Arthritis Mother     Hypertension Other     Colon cancer Neg Hx        Objective  Physical Exam:  /89   Pulse 71   Temp 98.6 °F (37 °C) (Oral)   Resp 16   Ht 182.9 cm (72\")   Wt 129 kg (285 lb)   SpO2 100%   BMI 38.65 kg/m²      Physical Exam  Constitutional:       General: He is not in acute distress.     Appearance: He is well-developed. He is obese. He is not ill-appearing, toxic-appearing or diaphoretic.   HENT:      Head: Normocephalic and atraumatic.   Cardiovascular:      Rate and Rhythm: Normal rate and regular rhythm.      Heart sounds: Normal heart sounds.   Pulmonary:      Effort: Pulmonary effort is normal. No respiratory distress.      Breath sounds: Normal breath sounds. No wheezing.   Abdominal:      " General: Bowel sounds are normal. There is distension.      Tenderness: There is generalized abdominal tenderness. There is no guarding or rebound.      Hernia: No hernia is present.   Skin:     General: Skin is warm and dry.      Capillary Refill: Capillary refill takes less than 2 seconds.      Coloration: Skin is not jaundiced or mottled.      Findings: No erythema.   Neurological:      General: No focal deficit present.      Mental Status: He is alert and oriented to person, place, and time.   Psychiatric:         Mood and Affect: Mood normal.         Behavior: Behavior normal.         Procedures    ED Course:         Lab Results (last 24 hours)       Procedure Component Value Units Date/Time    CBC & Differential [[id number]]  (Abnormal) Collected: 04/24/25 1605    Specimen: Blood Updated: 04/24/25 1620    Narrative:      The following orders were created for panel order CBC & Differential.  Procedure                               Abnormality         Status                     ---------                               -----------         ------                     CBC Auto Differential[[id number]]        Abnormal            Final result                 Please view results for these tests on the individual orders.    Comprehensive Metabolic Panel [[id number]]  (Abnormal) Collected: 04/24/25 1605    Specimen: Blood Updated: 04/24/25 1646     Glucose 90 mg/dL      BUN 16 mg/dL      Creatinine 1.19 mg/dL      Sodium 139 mmol/L      Potassium 3.8 mmol/L      Comment: Specimen hemolyzed.  Result may be falsely elevated.        Chloride 100 mmol/L      CO2 27.7 mmol/L      Calcium 8.7 mg/dL      Total Protein 7.1 g/dL      Albumin 3.7 g/dL      ALT (SGPT) 12 U/L      AST (SGOT) 31 U/L      Alkaline Phosphatase 191 U/L      Total Bilirubin 0.8 mg/dL      Globulin 3.4 gm/dL      A/G Ratio 1.1 g/dL      BUN/Creatinine Ratio 13.4     Anion Gap 11.3 mmol/L      eGFR 71.2 mL/min/1.73     Narrative:      GFR Categories in Chronic  Kidney Disease (CKD)      GFR Category          GFR (mL/min/1.73)    Interpretation  G1                     90 or greater         Normal or high (1)  G2                      60-89                Mild decrease (1)  G3a                   45-59                Mild to moderate decrease  G3b                   30-44                Moderate to severe decrease  G4                    15-29                Severe decrease  G5                    14 or less           Kidney failure          (1)In the absence of evidence of kidney disease, neither GFR category G1 or G2 fulfill the criteria for CKD.    eGFR calculation 2021 CKD-EPI creatinine equation, which does not include race as a factor    Lipase [[id number]]  (Abnormal) Collected: 04/24/25 1605    Specimen: Blood Updated: 04/24/25 1644     Lipase 69 U/L     Lactic Acid, Plasma [[id number]]  (Normal) Collected: 04/24/25 1605    Specimen: Blood Updated: 04/24/25 1642     Lactate 1.0 mmol/L     CBC Auto Differential [[id number]]  (Abnormal) Collected: 04/24/25 1605    Specimen: Blood Updated: 04/24/25 1620     WBC 6.37 10*3/mm3      RBC 5.39 10*6/mm3      Hemoglobin 13.1 g/dL      Hematocrit 41.8 %      MCV 77.6 fL      MCH 24.3 pg      MCHC 31.3 g/dL      RDW 16.6 %      RDW-SD 46.7 fl      MPV 10.4 fL      Platelets 290 10*3/mm3      Neutrophil % 63.5 %      Lymphocyte % 21.8 %      Monocyte % 10.7 %      Eosinophil % 3.6 %      Basophil % 0.2 %      Immature Grans % 0.2 %      Neutrophils, Absolute 4.05 10*3/mm3      Lymphocytes, Absolute 1.39 10*3/mm3      Monocytes, Absolute 0.68 10*3/mm3      Eosinophils, Absolute 0.23 10*3/mm3      Basophils, Absolute 0.01 10*3/mm3      Immature Grans, Absolute 0.01 10*3/mm3     Urinalysis With Microscopic If Indicated (No Culture) - Urine, Clean Catch [[id number]]  (Abnormal) Collected: 04/24/25 1619    Specimen: Urine, Clean Catch Updated: 04/24/25 1639     Color, UA Yellow     Appearance, UA Clear     pH, UA 6.0     Specific Gravity, UA  1.025     Glucose,  mg/dL (Trace)     Ketones, UA Trace     Bilirubin, UA Negative     Blood, UA Negative     Protein,  mg/dL (2+)     Leuk Esterase, UA Negative     Nitrite, UA Negative     Urobilinogen, UA 1.0 E.U./dL    Urinalysis, Microscopic Only - Urine, Clean Catch [[id number]]  (Abnormal) Collected: 04/24/25 1619    Specimen: Urine, Clean Catch Updated: 04/24/25 1639     RBC, UA 0-2 /HPF      WBC, UA 0-2 /HPF      Bacteria, UA None Seen /HPF      Squamous Epithelial Cells, UA 0-2 /HPF      Hyaline Casts, UA 3-6 /LPF      Mucus, UA Small/1+ /HPF      Methodology Manual Light Microscopy             CT Abdomen Pelvis With Contrast  Result Date: 4/24/2025  CT ABDOMEN PELVIS W CONTRAST Date of Exam: 4/24/2025 6:04 PM EDT Indication: abd pain and distension. Comparison: 7/15/2024 Technique: Axial CT images were obtained of the abdomen and pelvis following the uneventful intravenous administration of intravenous Isovue. Reconstructed coronal and sagittal images were also obtained. Automated exposure control and iterative construction methods were used. FINDINGS: Abdomen/Pelvis: Lower Chest: Moderate right-sided pleural effusion and associated consolidation at the right lung base is similar to slightly increased in the comparison. Lung bases otherwise grossly clear. Bilateral gynecomastia noted. No free air is noted below the diaphragm Organs: Large amount of ascites is noted which is increased from the comparison surrounding the liver which is heterogeneous in appearance and nodular in contour compatible with a cirrhotic morphology. No definite focal lesion identified. Gallbladder appears contracted with increased density suggesting stones. Spleen appears to be normal in size. The pancreas, kidneys and adrenal glands appear grossly unremarkable GI/Bowel: Esophageal varices noted at the GE junction. The stomach appears to be decompressed and otherwise unremarkable in appearance. Fluid surrounding the  small bowel is noted without definite acute abnormality appreciated of the bowel. No pathologically enlarged lymph nodes are appreciated within the mesentery. Mesenteric vasculature appears to be patent. The ileocecal valve is grossly unremarkable in appearance. The appendix is visualized and appears to be within normal limits the colon demonstrates no significant wall thickening or acute abnormality. Diverticuli are noted Pelvis: Urinary bladder is grossly unremarkable. Prostate is grossly unremarkable. No suspicious pelvic adenopathy Peritoneum/Retroperitoneum: Aorta is normal in caliber. No suspicious retroperitoneal adenopathy Bones/Soft Tissues: No acute osseous abnormality.     Impression: 1. Heterogeneous appearance of the liver with a cirrhotic morphology noted. No obvious focal lesion identified. 2. There is a large amount of ascites which is increased compared to the previous exam 3. Moderate size right-sided pleural effusion with associated atelectasis demonstrating slight progression from the comparison 4. Diverticulosis without evidence of acute diverticulitis 5. Other findings as above Electronically Signed: James Mccoy MD  4/24/2025 6:57 PM EDT  Workstation ID: [id number]         [facility]     Amount and/or Complexity of Data Reviewed  Clinical lab tests: reviewed  Tests in the radiology section of CPT®: reviewed        Initial impression of presenting illness: Patient is well-developed well-nourished male in no acute distress upon exam    DDX: includes but is not limited to: Ascites due to cirrhosis, pancreatitis, viral gastroenteritis    Pertinent features from physical exam: Abdomen appears moderately distended.  Patient mildly tender throughout abdomen.  Lungs clear to auscultation bilaterally.    Initial diagnostic plan: CBC, CMP, UA, lipase, lactic and CT abdomen pelvis    Results from initial plan were reviewed and interpreted by me revealing moderate ascites and evidence of cirrhosis on patient's  CT.  Pleural effusion noted to right lung, patient denies shortness of breath.       Interventions: Medications administered as below    Medications   iopamidol (ISOVUE-300) 61 % injection 100 mL (90 mL Intravenous Given 4/24/25 1813)       Reevaluation: Discussed with patient findings of CT exam and labs.  Discussed need for therapeutic paracentesis to treat patient's abdominal distention.  Discussed potential for admittance for inpatient therapeutic paracentesis, patient stated that he did not feel short of breath and did not feel uncomfortable enough to need to come into the hospital.  Advised him to follow-up with his GI tomorrow for routine paracentesis done outpatient.        Counseling: Discussed the results above with the patient regarding need for follow-up with GI for routine paracentesis.  Discussed with patient signs and symptoms that warrant return to the emergency room including fever, shortness of breath, worsening of pain.  Advised patient if he is unable to get an outpatient therapeutic paracentesis to return to the emergency department.  Patient understands and agrees plan of care.      -----  ED Disposition       ED Disposition   Discharge    Condition   Stable    Comment   With prompt GI follow-up             Final diagnoses:   Other ascites      Your Follow-Up Providers       Emmy Aguilar, APRN.    Specialty: Family Medicine  Follow up details: Reevaluation of todays symptoms  [address]  [address]  [place] [id number] [phone]               Brittany Sandoval MD. Call on 4/25/2025.    Specialty: Gastroenterology  Follow up details: Reevaluation of todays symptoms  [address]  [address]  [place] [id number] [phone]               Go to  [facility] EMERGENCY DEPARTMENT [place].    Specialty: Emergency Medicine  Follow up details: As needed, If symptoms worsen  [address]  [facility]  [address]  [phone]                     Contact information for after-discharge care    Follow-up information has not been specified.                    Your medication list        CHANGE how you take these medications        Instructions Last Dose Given Next Dose Due   furosemide 40 MG tablet  Commonly known as: LASIX  What changed: when to take this      Take 1 tablet by mouth Daily.              CONTINUE taking these medications        Instructions Last Dose Given Next Dose Due   Allopurinol 200 MG tablet      Take 200 mg by mouth Daily.       apixaban 5 MG tablet tablet  Commonly known as: ELIQUIS      Take 1 tablet by mouth Every 12 (Twelve) Hours.       atorvastatin 20 MG tablet  Commonly known as: LIPITOR      Take 1 tablet by mouth Daily.       lisinopril 30 MG tablet  Commonly known as: PRINIVIL,ZESTRIL      Take 1 tablet by mouth Daily.       metoprolol succinate  MG 24 hr tablet  Commonly known as: TOPROL-XL      Take 1 tablet by mouth Daily.       pantoprazole 40 MG EC tablet  Commonly known as: PROTONIX      Take 1 tablet by mouth Daily. Indications: Esophagus Inflammation with Erosion       pregabalin 75 MG capsule  Commonly known as: LYRICA      Take 1 capsule by mouth 2 (Two) Times a Day. Must schedule and keep appt       Testosterone Cypionate 200 MG/ML injection  Commonly known as: DEPOTESTOTERONE CYPIONATE      Inject 1 mL into the appropriate muscle as directed by prescriber Every 14 (Fourteen) Days.

## 2025-04-25 ENCOUNTER — TELEPHONE (OUTPATIENT)
Dept: GASTROENTEROLOGY | Facility: CLINIC | Age: 55
End: 2025-04-25
Payer: COMMERCIAL

## 2025-04-25 DIAGNOSIS — K74.60 CIRRHOSIS OF LIVER WITH ASCITES, UNSPECIFIED HEPATIC CIRRHOSIS TYPE: Primary | ICD-10-CM

## 2025-04-25 DIAGNOSIS — R18.8 CIRRHOSIS OF LIVER WITH ASCITES, UNSPECIFIED HEPATIC CIRRHOSIS TYPE: Primary | ICD-10-CM

## 2025-04-25 RX ORDER — ALBUMIN (HUMAN) 12.5 G/50ML
25 SOLUTION INTRAVENOUS ONCE
OUTPATIENT
Start: 2025-04-25 | End: 2025-04-25

## 2025-04-25 RX ORDER — SPIRONOLACTONE 100 MG/1
100 TABLET, FILM COATED ORAL DAILY
Qty: 30 TABLET | Refills: 0 | Status: SHIPPED | OUTPATIENT
Start: 2025-04-25

## 2025-04-25 NOTE — DISCHARGE INSTRUCTIONS
You are seen in the emergency department for abdominal distention and pain.  Today your imaging showed ascites due to liver cirrhosis.  It is advised that you call as soon as possible to schedule for follow-up with your GI physician to receive an outpatient paracentesis.  In addition please follow-up with your primary care physician for reevaluation of today's symptoms.  If you are unable to get into your GI doctor or develop shortness of breath, fever, worsening of pain or any worsening of symptoms please return to the emergency department.

## 2025-04-25 NOTE — TELEPHONE ENCOUNTER
----- Message from Gianna CARROLL sent at 4/25/2025 11:23 AM EDT -----  Has a large amount of fluid in abdomen, ER wants him to check with Dr. Sandoval to do paracentesis.  Normally sees Julia, but will now need you or Dr. Sandoval.  Scheduling the FUP appt now, but will be a couple weeks.

## 2025-04-25 NOTE — TELEPHONE ENCOUNTER
Ordered urgent US para for pt. Also, recommend he start spironolactone 100 mg once daily in addition to his lasix 40 mg and take it once daily. Have repeat lab in 1 week to check kidney function and electrolytes.

## 2025-05-01 ENCOUNTER — TELEPHONE (OUTPATIENT)
Dept: INFUSION THERAPY | Facility: HOSPITAL | Age: 55
End: 2025-05-01
Payer: COMMERCIAL

## 2025-05-01 NOTE — TELEPHONE ENCOUNTER
Pt contacted as pre-procedure phone call prior to planned Paracentesis for 5/2/25. Reviewed with patient arrival time, location,   needed, reviewed procedure instructions and allowed time for questions, and reviewed home medications, allergies, and medical history.

## 2025-05-02 ENCOUNTER — HOSPITAL ENCOUNTER (OUTPATIENT)
Dept: ULTRASOUND IMAGING | Facility: HOSPITAL | Age: 55
Discharge: HOME | End: 2025-05-02
Payer: COMMERCIAL

## 2025-05-02 VITALS
TEMPERATURE: 97.4 F | SYSTOLIC BLOOD PRESSURE: 154 MMHG | HEART RATE: 59 BPM | WEIGHT: 163.2 LBS | DIASTOLIC BLOOD PRESSURE: 89 MMHG | HEIGHT: 72 IN | RESPIRATION RATE: 18 BRPM | BODY MASS INDEX: 22.11 KG/M2 | OXYGEN SATURATION: 97 %

## 2025-05-02 DIAGNOSIS — K74.60 CIRRHOSIS OF LIVER WITH ASCITES, UNSPECIFIED HEPATIC CIRRHOSIS TYPE: ICD-10-CM

## 2025-05-02 DIAGNOSIS — R18.8 CIRRHOSIS OF LIVER WITH ASCITES, UNSPECIFIED HEPATIC CIRRHOSIS TYPE: ICD-10-CM

## 2025-05-02 LAB
ALBUMIN FLD-MCNC: 2.8 G/DL
APPEARANCE FLD: CLEAR
BASOPHILS NFR FLD: 1 %
COLOR FLD: YELLOW
LYMPHOCYTES NFR FLD MANUAL: 33 %
MACROPHAGE FLUID %: 60 %
MONOCYTES NFR FLD: 2 %
NEUTROPHILS NFR FLD MANUAL: 4 %
PROT FLD-MCNC: 4.6 G/DL
RBC # FLD AUTO: <2000 /MM3
WBC # FLD AUTO: 270 /MM3

## 2025-05-02 PROCEDURE — 87015 SPECIMEN INFECT AGNT CONCNTJ: CPT | Performed by: PHYSICIAN ASSISTANT

## 2025-05-02 PROCEDURE — 25010000002 LIDOCAINE 1 % SOLUTION: Performed by: PHYSICIAN ASSISTANT

## 2025-05-02 PROCEDURE — 25010000002 ALBUMIN HUMAN 25% PER 50 ML: Performed by: PHYSICIAN ASSISTANT

## 2025-05-02 PROCEDURE — 82042 OTHER SOURCE ALBUMIN QUAN EA: CPT | Performed by: PHYSICIAN ASSISTANT

## 2025-05-02 PROCEDURE — 76942 ECHO GUIDE FOR BIOPSY: CPT

## 2025-05-02 PROCEDURE — 84157 ASSAY OF PROTEIN OTHER: CPT | Performed by: PHYSICIAN ASSISTANT

## 2025-05-02 PROCEDURE — 87205 SMEAR GRAM STAIN: CPT | Performed by: PHYSICIAN ASSISTANT

## 2025-05-02 PROCEDURE — 87070 CULTURE OTHR SPECIMN AEROBIC: CPT | Performed by: PHYSICIAN ASSISTANT

## 2025-05-02 PROCEDURE — P9047 ALBUMIN (HUMAN), 25%, 50ML: HCPCS | Performed by: PHYSICIAN ASSISTANT

## 2025-05-02 PROCEDURE — 89051 BODY FLUID CELL COUNT: CPT | Performed by: PHYSICIAN ASSISTANT

## 2025-05-02 RX ORDER — LIDOCAINE HYDROCHLORIDE 10 MG/ML
10 INJECTION, SOLUTION INFILTRATION; PERINEURAL ONCE
Status: COMPLETED | OUTPATIENT
Start: 2025-05-02 | End: 2025-05-02

## 2025-05-02 RX ORDER — ALBUMIN (HUMAN) 12.5 G/50ML
25 SOLUTION INTRAVENOUS ONCE
Status: COMPLETED | OUTPATIENT
Start: 2025-05-02 | End: 2025-05-02

## 2025-05-02 RX ORDER — SODIUM CHLORIDE 0.9 % (FLUSH) 0.9 %
10 SYRINGE (ML) INJECTION AS NEEDED
Status: DISCONTINUED | OUTPATIENT
Start: 2025-05-02 | End: 2025-05-03 | Stop reason: HOSPADM

## 2025-05-02 RX ORDER — SODIUM CHLORIDE 0.9 % (FLUSH) 0.9 %
10 SYRINGE (ML) INJECTION EVERY 12 HOURS SCHEDULED
Status: DISCONTINUED | OUTPATIENT
Start: 2025-05-02 | End: 2025-05-03 | Stop reason: HOSPADM

## 2025-05-02 RX ORDER — SODIUM CHLORIDE 9 MG/ML
40 INJECTION, SOLUTION INTRAVENOUS AS NEEDED
Status: DISCONTINUED | OUTPATIENT
Start: 2025-05-02 | End: 2025-05-03 | Stop reason: HOSPADM

## 2025-05-02 RX ADMIN — ALBUMIN (HUMAN) 25 G: 0.25 INJECTION, SOLUTION INTRAVENOUS at 15:08

## 2025-05-02 RX ADMIN — LIDOCAINE HYDROCHLORIDE 9 ML: 10 INJECTION, SOLUTION INFILTRATION; PERINEURAL at 14:09

## 2025-05-02 NOTE — PRE-PROCEDURE NOTE
[facility]   Interventional Radiology H&P    Patient Name: Neal Lopez  : 1970  MRN: [id number]  Primary Care Physician:  Emmy Aguilar APRN  Referring Physician: Mee Leung PA-C  Date of admission: 2025    Subjective   Subjective     HPI:  Neal Lopez is a 55 y.o. male with a history of ascites.  Patient presents to interventional radiology for image guided paracentesis.    Review of Systems:   Constitutional no fever,  no weight loss       Otolaryngeal no difficulty swallowing   Cardiovascular no chest pain   Pulmonary no cough, no sputum production   Gastrointestinal no constipation, no diarrhea                         Personal History       Past Medical/Surgical History:   Past Medical History:   Diagnosis Date    A-fib     Anemia 2024    Arthritis     Cervical disc disorder     Chronic kidney disease     Cirrhosis     Claustrophobia     Coronary artery disease     Edema     Elevated cholesterol     Fatty liver     Gout     Hyperlipidemia     Hypertension     Irregular heart rhythm     Liver disease     Low back pain     Lumbosacral disc disease     Mitral regurgitation     MILD BY ECHO    Morbid obesity     Peripheral neuropathy     PONV (postoperative nausea and vomiting)     Pulmonary arterial hypertension     Sleep apnea     CPAP    Thoracic disc disorder     Tricuspid regurgitation     MILD BY ECHO     Past Surgical History:   Procedure Laterality Date    CARDIAC ABLATION  10/03/2024    COLONOSCOPY N/A 10/07/2024    Procedure: Colonoscopy with biopsy and polypectomy and clipping;  Surgeon: Brittany Sandoval MD;  Location: [facility] ENDOSCOPY;  Service: Gastroenterology;  Laterality: N/A;    ENDOSCOPY N/A 2025    Procedure: Esophagogastroduodenoscopy with biopsy and polypectomy x1;  Surgeon: Brittany Sandoval MD;  Location: [facility] ENDOSCOPY;  Service: Gastroenterology;  Laterality: N/A;    OTHER SURGICAL HISTORY  2017    [facility]-SJE/JASMEET/SUCCESSFUL  "   OTHER SURGICAL HISTORY  04/10/2023    [facility]- [facility]    THORACIC LAMINECTOMY  2020    Dr. Vital  - EMERGENT    TONSILLECTOMY         Social History:  reports that he has never smoked. He has never been exposed to tobacco smoke. He has never used smokeless tobacco. He reports that he does not drink alcohol and does not use drugs.    Medications:  (Not in a hospital admission)    Current medications:  albumin human, 25 g, Intravenous, Once  sodium chloride, 10 mL, Intravenous, Q12H      Current IV drips:       Allergies:  No Known Allergies    Objective    Objective     Vitals:   Temp:  [97.4 °F (36.3 °C)] 97.4 °F (36.3 °C)  Heart Rate:  [71] 71  Resp:  [18] 18  BP: (144-153)/() 144/92      Physical Exam:   Constitutional: Awake, alert, No acute distress    Respiratory: nonlabored respirations    Gastrointestinal: soft, nontender, distended                Result Review        Result Review:     No results found for: \"NA\"    No results found for: \"K\"    No results found for: \"CL\"    No results found for: \"PLASMABICARB\"    No results found for: \"BUN\"    No results found for: \"CREATININE\"    No results found for: \"CALCIUM\"        No components found for: \"GLUCOSE.*\"                 Assessment / Plan     Assesment:  55-year-old male with a history of ascites.  Patient presents to interventional radiology for image guided paracentesis.      Plan:   Image guided paracentesis with local anesthesia only.    The risks and benefits of the procedure were discussed with the patient.    Electronically signed by Fredo Goode MD, 05/02/25, 2:55 PM EDT.    "

## 2025-05-02 NOTE — OP NOTE
IR POST OP NOTE    Physician:Fredo Goode MD      Procedure: Image guided paracentesis.        Pre Op DX: Ascites      Post Op DX: Same      Anesthesia: Local only      Findings: Large ascites      Complications: No immediate      Provider Signature: Fredo Goode MD    05/02/25  14:56 EDT

## 2025-05-05 ENCOUNTER — TELEPHONE (OUTPATIENT)
Dept: INFUSION THERAPY | Facility: HOSPITAL | Age: 55
End: 2025-05-05
Payer: COMMERCIAL

## 2025-05-05 LAB
BACTERIA FLD CULT: NORMAL
GRAM STN SPEC: NORMAL
GRAM STN SPEC: NORMAL
REF LAB TEST METHOD: NORMAL

## 2025-05-13 ENCOUNTER — OFFICE VISIT (OUTPATIENT)
Dept: GASTROENTEROLOGY | Facility: CLINIC | Age: 55
End: 2025-05-13
Payer: COMMERCIAL

## 2025-05-13 VITALS
HEART RATE: 59 BPM | DIASTOLIC BLOOD PRESSURE: 69 MMHG | BODY MASS INDEX: 33.86 KG/M2 | WEIGHT: 250 LBS | SYSTOLIC BLOOD PRESSURE: 100 MMHG | HEIGHT: 72 IN | OXYGEN SATURATION: 97 % | TEMPERATURE: 98.2 F

## 2025-05-13 DIAGNOSIS — R18.8 CIRRHOSIS OF LIVER WITH ASCITES, UNSPECIFIED HEPATIC CIRRHOSIS TYPE: Primary | ICD-10-CM

## 2025-05-13 DIAGNOSIS — K22.10 EROSIVE ESOPHAGITIS: ICD-10-CM

## 2025-05-13 DIAGNOSIS — Z86.2 HISTORY OF ANEMIA: ICD-10-CM

## 2025-05-13 DIAGNOSIS — K59.00 CONSTIPATION, UNSPECIFIED CONSTIPATION TYPE: ICD-10-CM

## 2025-05-13 DIAGNOSIS — K74.60 CIRRHOSIS OF LIVER WITH ASCITES, UNSPECIFIED HEPATIC CIRRHOSIS TYPE: Primary | ICD-10-CM

## 2025-05-13 PROCEDURE — 3074F SYST BP LT 130 MM HG: CPT | Performed by: INTERNAL MEDICINE

## 2025-05-13 PROCEDURE — 1160F RVW MEDS BY RX/DR IN RCRD: CPT | Performed by: INTERNAL MEDICINE

## 2025-05-13 PROCEDURE — 3078F DIAST BP <80 MM HG: CPT | Performed by: INTERNAL MEDICINE

## 2025-05-13 PROCEDURE — 99214 OFFICE O/P EST MOD 30 MIN: CPT | Performed by: INTERNAL MEDICINE

## 2025-05-13 PROCEDURE — 1159F MED LIST DOCD IN RCRD: CPT | Performed by: INTERNAL MEDICINE

## 2025-05-13 RX ORDER — POTASSIUM CHLORIDE 1500 MG/1
20 TABLET, EXTENDED RELEASE ORAL DAILY PRN
COMMUNITY
Start: 2025-05-05

## 2025-05-13 RX ORDER — FUROSEMIDE 40 MG/1
40 TABLET ORAL 2 TIMES DAILY
Qty: 60 TABLET | Refills: 5 | Status: SHIPPED | OUTPATIENT
Start: 2025-05-13

## 2025-05-13 RX ORDER — LEFLUNOMIDE 20 MG/1
20 TABLET ORAL DAILY
COMMUNITY
Start: 2025-05-03

## 2025-05-13 RX ORDER — SPIRONOLACTONE 100 MG/1
100 TABLET, FILM COATED ORAL DAILY
Qty: 90 TABLET | Refills: 1 | Status: SHIPPED | OUTPATIENT
Start: 2025-05-13

## 2025-05-13 RX ORDER — PANTOPRAZOLE SODIUM 20 MG/1
20 TABLET, DELAYED RELEASE ORAL DAILY
Qty: 90 TABLET | Refills: 3 | Status: SHIPPED | OUTPATIENT
Start: 2025-05-13

## 2025-05-13 RX ORDER — FLUTICASONE PROPIONATE 50 MCG
1 SPRAY, SUSPENSION (ML) NASAL DAILY PRN
COMMUNITY
Start: 2025-04-30

## 2025-05-13 NOTE — PROGRESS NOTES
Follow Up Note     Date: 2025   Patient Name: Neal Lopez  MRN: [id number]  : 1970     Referring Physician: Emmy Aguilar APRN    Chief Complaint: Cirrhosis      Interval History:   2025  Neal oLpez is a 55 y.o. male who is here today for follow up for his cirrhosis.  He is feeling much better after he had a diagnostic and therapeutic paracentesis on .  He had 8 L clear fluid drained.  No significant abdominal distention since then.  No constipation now has been having daily bowel movement 1 or 2/day.    2024  He reports experiencing bloating and constipation, which began a few months ago. Despite having bowel movements a couple of times a day, he feels they are insufficient. He has not undergone a colonoscopy before. He reports no presence of blood or black coloration in his stool, and also reports no abdominal pain, nausea, vomiting, reflux, or choking during meals. He is not currently taking any pain medications.     He has been on Eliquis for the past 7 to 8 years due to a history of atrial fibrillation. He underwent an ablation procedure in 2024, which successfully treated his atrial fibrillation. However, he was advised to continue with Eliquis as long as it did not cause stomach upset. He has not experienced any issues since then.He has been informed that he is developing cirrhosis during an ER visit recently. He reports no alcohol consumption, intravenous drug use, tattoos, or blood transfusions. His CT scan also revealed fatty liver disease.     FAMILY HISTORY  He denies any family history of colon cancer, stomach cancer, liver cancer, esophageal cancer or liver disease.  The patient has not had a colonoscopy or EGD in the past.        Subjective      Past Medical History:   Past Medical History:   Diagnosis Date    A-fib     Anemia 2024    Arthritis     Cervical disc disorder     Chronic kidney disease     Cirrhosis     Claustrophobia      Coronary artery disease     Edema     Elevated cholesterol     Esophageal varices     Fatty liver     Gout     Hyperlipidemia     Hypertension     Irregular heart rhythm     Liver disease     Low back pain     Lumbosacral disc disease     Mitral regurgitation     MILD BY ECHO    Morbid obesity     Peripheral neuropathy     PONV (postoperative nausea and vomiting)     Primary central sleep apnea     Pulmonary arterial hypertension     Rheumatoid arthritis     Sleep apnea     CPAP    Thoracic disc disorder     Tricuspid regurgitation     MILD BY ECHO     Past Surgical History:   Past Surgical History:   Procedure Laterality Date    ABLATION OF DYSRHYTHMIC FOCUS  2024    CARDIAC ABLATION  10/03/2024    COLONOSCOPY N/A 10/07/2024    Procedure: Colonoscopy with biopsy and polypectomy and clipping;  Surgeon: Brittany Sandoval MD;  Location: [facility] ENDOSCOPY;  Service: Gastroenterology;  Laterality: N/A;    ENDOSCOPY N/A 2025    Procedure: Esophagogastroduodenoscopy with biopsy and polypectomy x1;  Surgeon: Brittany Sandoval MD;  Location: [facility] ENDOSCOPY;  Service: Gastroenterology;  Laterality: N/A;    OTHER SURGICAL HISTORY  2017    DCCV-SJE/JASMEET/SUCCESSFUL    OTHER SURGICAL HISTORY  04/10/2023    DCCV #2- SJE/LPOEZ/SUCCESSFUL DCCV    PARACENTESIS N/A 2025    THORACIC LAMINECTOMY      Dr. Vital  - EMERGENT    TONSILLECTOMY         Family History:   Family History   Problem Relation Age of Onset    Arthritis Mother     Hypertension Other     Asthma Father              Colon cancer Neg Hx        Social History:   Social History     Socioeconomic History    Marital status:    Tobacco Use    Smoking status: Never     Passive exposure: Never    Smokeless tobacco: Never   Vaping Use    Vaping status: Never Used   Substance and Sexual Activity    Alcohol use: Never    Drug use: Never    Sexual activity: Defer       Medications:     Current Outpatient Medications:      Allopurinol 200 MG tablet, Take 200 mg by mouth Daily., Disp: , Rfl:     apixaban (ELIQUIS) 5 MG tablet tablet, Take 1 tablet by mouth Every 12 (Twelve) Hours., Disp: 180 tablet, Rfl: 1    atorvastatin (LIPITOR) 20 MG tablet, Take 1 tablet by mouth Daily., Disp: 90 tablet, Rfl: 1    fluticasone (FLONASE) 50 MCG/ACT nasal spray, Administer 1 spray into the nostril(s) as directed by provider Daily As Needed., Disp: , Rfl:     furosemide (LASIX) 40 MG tablet, Take 1 tablet by mouth 2 (Two) Times a Day., Disp: 60 tablet, Rfl: 5    leflunomide (ARAVA) 20 MG tablet, Take 1 tablet by mouth Daily., Disp: , Rfl:     lisinopril (PRINIVIL,ZESTRIL) 30 MG tablet, Take 1 tablet by mouth Daily., Disp: 90 tablet, Rfl: 1    metoprolol succinate XL (TOPROL-XL) 200 MG 24 hr tablet, Take 1 tablet by mouth Daily., Disp: , Rfl:     pantoprazole (PROTONIX) 20 MG EC tablet, Take 1 tablet by mouth Daily. Indications: Esophagus Inflammation with Erosion, Disp: 90 tablet, Rfl: 3    potassium chloride (KLOR-CON M20) 20 MEQ CR tablet, Take 1 tablet by mouth Daily As Needed., Disp: , Rfl:     pregabalin (LYRICA) 75 MG capsule, Take 1 capsule by mouth 2 (Two) Times a Day. Must schedule and keep appt, Disp: 60 capsule, Rfl: 0    spironolactone (ALDACTONE) 100 MG tablet, Take 1 tablet by mouth Daily., Disp: 90 tablet, Rfl: 1    Testosterone Cypionate (DEPOTESTOTERONE CYPIONATE) 200 MG/ML injection, Inject 1 mL into the appropriate muscle as directed by prescriber Every 14 (Fourteen) Days., Disp: , Rfl:     Allergies:   No Known Allergies    Review of Systems:   Review of Systems   Constitutional:  Positive for appetite change and unexpected weight loss (post paracentesis). Negative for fatigue and fever.   HENT:  Negative for trouble swallowing.    Gastrointestinal:  Positive for diarrhea. Negative for abdominal distention, abdominal pain, anal bleeding, blood in stool, constipation, nausea, rectal pain, vomiting, GERD and indigestion.  "      The following portions of the patient's history were reviewed and updated as appropriate: allergies, current medications, past family history, past medical history, past social history, past surgical history and problem list.    Objective     Physical Exam:  Vital Signs:   Vitals:    25 1608   BP: 100/69   Pulse: 59   Temp: 98.2 °F (36.8 °C)   TempSrc: Infrared   SpO2: 97%   Weight: 113 kg (250 lb)  Comment: with clothing/shoes   Height: 182.9 cm (72\")  Comment: per EPIC       Physical Exam  Constitutional:       Appearance: He is obese.   HENT:      Head: Normocephalic and atraumatic.   Eyes:      Conjunctiva/sclera: Conjunctivae normal.   Abdominal:      General: Abdomen is flat. There is no distension.      Palpations: There is no mass.      Tenderness: There is no abdominal tenderness. There is no guarding or rebound.      Hernia: No hernia is present.   Musculoskeletal:      Cervical back: Normal range of motion and neck supple.   Neurological:      Mental Status: He is alert.         Results Review:   I reviewed the patient's new clinical results.    Hospital Outpatient Visit on 2025   Component Date Value Ref Range Status    Albumin, Fluid 2025 2.80  g/dL Final    Protein, Total, Fluid 2025 4.6  g/dL Final    Body Fluid Culture 2025 No growth at 3 days   Final    Gram Stain 2025 Rare (1+) WBCs seen   Final    Gram Stain 2025 No organisms seen   Final    Reference Lab Report 2025    Final                    Value:[facility]  [address] [place] [address]  Phone: [phone] or [phone]  Fax: [phone]  Lucas Fisher M.D., Medical Director    PATIENT NAME                                 LABORATORY NO.  [id number]   DEYANIRA LEROYTom                              [id number]  [id number]                                     AGE                SEX     SSN            CLIENT REF #  [facility]                    "    55       1970          [id number]    [id number]  [address] [facility]                          REQUESTING M.D.       ATTENDING M.D..        [place]..  [place] [address]                            ADONIS GARCIA  DATE COLLECTED        DATE RECEIVED          DATE REPORTED  05/02/2025 05/02/2025 05/05/2025    DIAGNOSIS:  PERITONEAL FLUID:  Negative for malignant cells.    MICROSCOPIC DESCRIPTION:  Scattered benign mesothelial cells are present admixed with inflammatory cells.    Professional interpretation                           rendered by Franca Gallegos M.D., F.C.A.P. at [email or web address] [employer], [address] [place] [address].    CLINICAL HISTORY:  Cirrhosis of liver with ascites    SPECIMENS SUBMITTED:  PERITONEAL FLUID    GROSS SPECIMEN DESCRIPTION:  50 ccs of hazy, bright yellow fluid, with white, wispy sediment, received in  fixative  ThinPrep slides prepared.  Cell block has been examined.    CYTOTECHNOLOGIST:         SPENCER BLAIR (ASCP)                       REVIEWED, DIAGNOSED AND ELECTRONICALLY  SIGNED BY:    Franca Gallegos M.D., F.C.A.P.  CPT CODES:  50580, 18301      Color, Fluid 05/02/2025 Yellow   Final    Appearance, Fluid 05/02/2025 Clear  Clear Final    WBC, Fluid 05/02/2025 270  /mm3 Final    RBC, Fluid 05/02/2025 <2,000    /mm3 Final    Neutrophils, Fluid % 05/02/2025 4  % Final    Lymphocytes, Fluid % 05/02/2025 33  % Final    Monocytes, Fluid % 05/02/2025 2  % Final    Basophils, Fluid % 05/02/2025 1  % Final    Macrophage, Fluid % 05/02/2025 60  % Final   Admission on 04/24/2025, Discharged on 04/24/2025   Component Date Value Ref Range Status    Glucose 04/24/2025 90  65 - 99 mg/dL Final    BUN 04/24/2025 16  6 - 20 mg/dL Final    Creatinine 04/24/2025 1.19  0.76 - 1.27 mg/dL Final    Sodium 04/24/2025 139  136 - 145 mmol/L Final    Potassium 04/24/2025 3.8  3.5 - 5.2 mmol/L Final    Specimen hemolyzed.  Result may be falsely elevated.    Chloride  04/24/2025 100  98 - 107 mmol/L Final    CO2 04/24/2025 27.7  22.0 - 29.0 mmol/L Final    Calcium 04/24/2025 8.7  8.6 - 10.5 mg/dL Final    Total Protein 04/24/2025 7.1  6.0 - 8.5 g/dL Final    Albumin 04/24/2025 3.7  3.5 - 5.2 g/dL Final    ALT (SGPT) 04/24/2025 12  1 - 41 U/L Final    AST (SGOT) 04/24/2025 31  1 - 40 U/L Final    Alkaline Phosphatase 04/24/2025 191 (H)  39 - 117 U/L Final    Total Bilirubin 04/24/2025 0.8  0.0 - 1.2 mg/dL Final    Globulin 04/24/2025 3.4  gm/dL Final    A/G Ratio 04/24/2025 1.1  g/dL Final    BUN/Creatinine Ratio 04/24/2025 13.4  7.0 - 25.0 Final    Anion Gap 04/24/2025 11.3  5.0 - 15.0 mmol/L Final    eGFR 04/24/2025 71.2  >60.0 mL/min/1.73 Final    Lipase 04/24/2025 69 (H)  13 - 60 U/L Final    Color, UA 04/24/2025 Yellow  Yellow, Straw Final    Appearance, UA 04/24/2025 Clear  Clear Final    pH, UA 04/24/2025 6.0  5.0 - 8.0 Final    Specific Gravity, UA 04/24/2025 1.025  1.005 - 1.030 Final    Glucose, UA 04/24/2025 100 mg/dL (Trace) (A)  Negative Final    Ketones, UA 04/24/2025 Trace (A)  Negative Final    Bilirubin, UA 04/24/2025 Negative  Negative Final    Blood, UA 04/24/2025 Negative  Negative Final    Protein, UA 04/24/2025 100 mg/dL (2+) (A)  Negative Final    Leuk Esterase, UA 04/24/2025 Negative  Negative Final    Nitrite, UA 04/24/2025 Negative  Negative Final    Urobilinogen, UA 04/24/2025 1.0 E.U./dL  0.2 - 1.0 E.U./dL Final    Lactate 04/24/2025 1.0  0.5 - 2.0 mmol/L Final    Extra Tube 04/24/2025 Hold for add-ons.   Final    Auto resulted.    Extra Tube 04/24/2025 hold for add-on   Final    Auto resulted    Extra Tube 04/24/2025 Hold for add-ons.   Final    Auto resulted.    Extra Tube 04/24/2025 Hold for add-ons.   Final    Auto resulted.    Extra Tube 04/24/2025 Hold for add-ons.   Final    Auto resulted    WBC 04/24/2025 6.37  3.40 - 10.80 10*3/mm3 Final    RBC 04/24/2025 5.39  4.14 - 5.80 10*6/mm3 Final    Hemoglobin 04/24/2025 13.1  13.0 - 17.7 g/dL Final     Hematocrit 04/24/2025 41.8  37.5 - 51.0 % Final    MCV 04/24/2025 77.6 (L)  79.0 - 97.0 fL Final    MCH 04/24/2025 24.3 (L)  26.6 - 33.0 pg Final    MCHC 04/24/2025 31.3 (L)  31.5 - 35.7 g/dL Final    RDW 04/24/2025 16.6 (H)  12.3 - 15.4 % Final    RDW-SD 04/24/2025 46.7  37.0 - 54.0 fl Final    MPV 04/24/2025 10.4  6.0 - 12.0 fL Final    Platelets 04/24/2025 290  140 - 450 10*3/mm3 Final    Neutrophil % 04/24/2025 63.5  42.7 - 76.0 % Final    Lymphocyte % 04/24/2025 21.8  19.6 - 45.3 % Final    Monocyte % 04/24/2025 10.7  5.0 - 12.0 % Final    Eosinophil % 04/24/2025 3.6  0.3 - 6.2 % Final    Basophil % 04/24/2025 0.2  0.0 - 1.5 % Final    Immature Grans % 04/24/2025 0.2  0.0 - 0.5 % Final    Neutrophils, Absolute 04/24/2025 4.05  1.70 - 7.00 10*3/mm3 Final    Lymphocytes, Absolute 04/24/2025 1.39  0.70 - 3.10 10*3/mm3 Final    Monocytes, Absolute 04/24/2025 0.68  0.10 - 0.90 10*3/mm3 Final    Eosinophils, Absolute 04/24/2025 0.23  0.00 - 0.40 10*3/mm3 Final    Basophils, Absolute 04/24/2025 0.01  0.00 - 0.20 10*3/mm3 Final    Immature Grans, Absolute 04/24/2025 0.01  0.00 - 0.05 10*3/mm3 Final    RBC, UA 04/24/2025 0-2  None Seen, 0-2 /HPF Final    WBC, UA 04/24/2025 0-2  None Seen, 0-2 /HPF Final    Bacteria, UA 04/24/2025 None Seen  None Seen /HPF Final    Squamous Epithelial Cells, UA 04/24/2025 0-2  None Seen, 0-2 /HPF Final    Hyaline Casts, UA 04/24/2025 3-6  None Seen /LPF Final    Mucus, UA 04/24/2025 Small/1+ (A)  None Seen, Trace /HPF Final    Methodology 04/24/2025 Manual Light Microscopy   Final   Lab on 04/10/2025   Component Date Value Ref Range Status    Protime 04/10/2025 14.9  12.3 - 15.1 Seconds Final    INR 04/10/2025 1.11 (H)  0.90 - 1.10 Final    Glucose 04/10/2025 87  65 - 99 mg/dL Final    BUN 04/10/2025 12  6 - 20 mg/dL Final    Creatinine 04/10/2025 1.05  0.76 - 1.27 mg/dL Final    Sodium 04/10/2025 139  136 - 145 mmol/L Final    Potassium 04/10/2025 4.1  3.5 - 5.2 mmol/L Final    Chloride  04/10/2025 99  98 - 107 mmol/L Final    CO2 04/10/2025 29.0  22.0 - 29.0 mmol/L Final    Calcium 04/10/2025 9.0  8.6 - 10.5 mg/dL Final    Total Protein 04/10/2025 7.1  6.0 - 8.5 g/dL Final    Albumin 04/10/2025 3.7  3.5 - 5.2 g/dL Final    ALT (SGPT) 04/10/2025 9  1 - 41 U/L Final    AST (SGOT) 04/10/2025 15  1 - 40 U/L Final    Alkaline Phosphatase 04/10/2025 190 (H)  39 - 117 U/L Final    Total Bilirubin 04/10/2025 1.0  0.0 - 1.2 mg/dL Final    Globulin 04/10/2025 3.4  gm/dL Final    A/G Ratio 04/10/2025 1.1  g/dL Final    BUN/Creatinine Ratio 04/10/2025 11.4  7.0 - 25.0 Final    Anion Gap 04/10/2025 11.0  5.0 - 15.0 mmol/L Final    eGFR 04/10/2025 83.3  >60.0 mL/min/1.73 Final    Total Cholesterol 04/10/2025 102  0 - 200 mg/dL Final    Triglycerides 04/10/2025 70  0 - 150 mg/dL Final    HDL Cholesterol 04/10/2025 19 (L)  40 - 60 mg/dL Final    LDL Cholesterol  04/10/2025 68  0 - 100 mg/dL Final    VLDL Cholesterol 04/10/2025 15  5 - 40 mg/dL Final    LDL/HDL Ratio 04/10/2025 3.63   Final    WBC 04/10/2025 7.38  3.40 - 10.80 10*3/mm3 Final    RBC 04/10/2025 5.15  4.14 - 5.80 10*6/mm3 Final    Hemoglobin 04/10/2025 12.5 (L)  13.0 - 17.7 g/dL Final    Hematocrit 04/10/2025 40.7  37.5 - 51.0 % Final    MCV 04/10/2025 79.0  79.0 - 97.0 fL Final    MCH 04/10/2025 24.3 (L)  26.6 - 33.0 pg Final    MCHC 04/10/2025 30.7 (L)  31.5 - 35.7 g/dL Final    RDW 04/10/2025 14.2  12.3 - 15.4 % Final    RDW-SD 04/10/2025 40.5  37.0 - 54.0 fl Final    MPV 04/10/2025 10.7  6.0 - 12.0 fL Final    Platelets 04/10/2025 352  140 - 450 10*3/mm3 Final    Neutrophil % 04/10/2025 63.5  42.7 - 76.0 % Final    Lymphocyte % 04/10/2025 17.5 (L)  19.6 - 45.3 % Final    Monocyte % 04/10/2025 9.1  5.0 - 12.0 % Final    Eosinophil % 04/10/2025 7.9 (H)  0.3 - 6.2 % Final    Basophil % 04/10/2025 1.6 (H)  0.0 - 1.5 % Final    Immature Grans % 04/10/2025 0.4  0.0 - 0.5 % Final    Neutrophils, Absolute 04/10/2025 4.69  1.70 - 7.00 10*3/mm3 Final     Lymphocytes, Absolute 04/10/2025 1.29  0.70 - 3.10 10*3/mm3 Final    Monocytes, Absolute 04/10/2025 0.67  0.10 - 0.90 10*3/mm3 Final    Eosinophils, Absolute 04/10/2025 0.58 (H)  0.00 - 0.40 10*3/mm3 Final    Basophils, Absolute 04/10/2025 0.12  0.00 - 0.20 10*3/mm3 Final    Immature Grans, Absolute 04/10/2025 0.03  0.00 - 0.05 10*3/mm3 Final    nRBC 04/10/2025 0.0  0.0 - 0.2 /100 WBC Final      US Paracentesis  Result Date: 5/2/2025   Ultrasound-guided paracentesis with drainage of 8300 mL of serous fluid.  Plan:  Resume care by clinical team. _______________________________________________________________  PROCEDURE SUMMARY: - Limited abdominal ultrasound - Ultrasound-guided paracentesis - Additional procedure(s): None  PROCEDURE DETAILS:  Pre-procedure Consent: Informed consent for the procedure including risks, benefits and alternatives was obtained and time-out was performed prior to the procedure. Preparation: The site was prepared and draped using maximal sterile barrier technique including cutaneous antisepsis.  Initial abdominal ultrasound Initial abdominal ultrasound was performed. Findings: Large ascites. A safe window for paracentesis was identified.  Paracentesis Local anesthesia was administered. The peritoneal cavity was accessed and fluid return confirmed position. Ascites was drained. The catheter was then removed, and a sterile bandage was applied. Paracentesis access technique: Real-time ultrasound guidance Catheter placed: 5F [place] Post-drainage ultrasound: Not performed  Additional Details Additional description of procedure: None Equipment details: None Specimens removed: Abdominal fluid Estimated blood loss (mL): Less than 10       5/2/2025 4:26 PM by Fredo Goode MD on Workstation: [id number]      CT Abdomen Pelvis With Contrast  Result Date: 4/24/2025  1. Heterogeneous appearance of the liver with a cirrhotic morphology noted. No obvious focal lesion identified. 2. There is a large  amount of ascites which is increased compared to the previous exam 3. Moderate size right-sided pleural effusion with associated atelectasis demonstrating slight progression from the comparison 4. Diverticulosis without evidence of acute diverticulitis 5. Other findings as above Electronically Signed: James Mccoy MD  4/24/2025 6:57 PM EDT  Workstation ID: [id number]    XR Spine Lumbar 2 or 3 View  Result Date: 4/10/2025  Minimal anterior wedging L1 vertebral body new compared to 7/15/2024.  Stable minimal compression fractures T11-T12 compared to July 2024.  Multilevel lumbar degenerative change similar to prior CT.       This report was signed and finalized on 4/10/2025 12:54 PM by Nelli Lucero MD.      XR Chest PA & Lateral  Result Date: 4/10/2025  Decreased interstitial edema compared to prior with new right effusion, recommend follow-up to document resolution..      This report was signed and finalized on 4/10/2025 12:44 PM by Nleli Lucero MD.      Dated 11/14/2024 total bilirubin 1.6 alkaline phosphatase 286 AST 25 ALT 19 hemoglobin 14.1 hematocrit 45.5 platelet count 270     CT Abdomen Pelvis With Contrast     Result Date: 7/15/2024  1. Cirrhosis with a small amount of ascites. 2. Bilateral pleural effusions with lower lobe airspace disease favored to be atelectasis although pneumonia not entirely excluded. 3. Small hyperdense pericardial effusion. Hemopericardium not excluded.        XR Chest 1 View     Result Date: 7/15/2024  New interstitial opacities. Favor pulmonary edema.           Colonoscopy dated 10/7/2024 per Dr. Sandoval  - Hemorrhoids found on perianal exam.  - Diverticulosis in the sigmoid colon.  - One 6 mm polyp in the proximal ascending colon, removed with a hot snare. Resected and retrieved.  - One 4 mm polyp in the mid transverse colon, removed with a hot snare. Resected and retrieved.  - One 10 mm polyp in the proximal descending colon, removed with a hot snare. Resected and retrieved.  Clip  was placed. Clip : [email or web address].  - One 6 mm polyp in the mid descending colon, removed with a hot snare. Resected and retrieved.  - The examined portion of the ileum was normal. Biopsied for anemia.  - Non-bleeding external and internal hemorrhoids.  A.     ASCENDING COLON POLYP:  Benign inflammatory polyp  B.     TERMINAL ILEUM BIOPSY:  Small intestinal mucosa with no significant histopathologic abnormalities  C.     TRANSVERSE COLON POLYP:  Adenomatous polyp (tubular adenoma)  Negative for high-grade dysplasia  D.     DESCENDING COLON POLYP, X2:  Adenomatous polyps (tubular adenomas)  Negative for high-grade dysplasia       EGD 01/22/25  - Normal oropharynx.   - Z-line irregular, 38 cm from the incisors.   - Tortuous esophagus.   - No obvious esophageal varices   - LA Grade A reflux esophagitis with no bleeding.   - Erythematous mucosa in the antrum and prepyloric region of the stomach. Biopsied.   - A few gastric polyps. Resected and retrieved.   - Normal duodenal bulb, first portion of the duodenum, second portion of the duodenum and third portion of the duodenum.   - Mild erosions vs mild developing AVMs pre pylori      DIAGNOSIS:  A. ANTRUM AND BODY BIOPSIES:  Gastric mucosa with reactive changes  No Helicobacter pylori-like organisms seen  Negative for dysplasia or malignancy  B. STOMACH, BODY, POLYP:  Fundic gland polyp  No Helicobacter pylori-like organisms seen  Negative for dysplasia or malignancy    Assessment / Plan      1.  Decompensated [facility] Cirrhosis of liver without ascites, unspecified hepatic cirrhosis type  MELD; 15 ( 9/17/2024)  MELD/MELD Na; 8 (4/2025)  2.  Constipation unspecified  3.  Erosive esophagitis  4.  History of normocytic anemia    5/13/2025  He lost over 40 pounds since last 6 months.  He had a recent diagnostic and therapeutic paracentesis.  Total protein 4.6 and albumin was 2.8.  Fluid negative for SBP.  No serum albumin available to assess for SAG.  Recent  echocardiogram revealed a normal EF with normal RVSP.  He has been having daily bowel movement now, no current constipation.  Last CMP on 4/24/2025 reveals alkaline phosphatase 191 otherwise normal electrolytes and liver numbers.  CBC revealed normal hemoglobin 13.1 platelet count of 290,000.  EGD on 01/22/2025 did not reveal any esophageal varices or diabetes gastropathy.  No GI bleeding.  Hemoglobin normal now.    Continue to work on losing 20 more pounds.  Low-salt diet  Continue Lasix 40 mg twice daily with Aldactone 100 mg p.o. daily  Will consider reducing the dose of diuretics after next ultrasound in 2 to 3 months time.  Will reduce the Protonix dose to 20 mg p.o. daily  MiraLAX 17 g p.o. daily as needed  Antireflux measures  Ultrasound abdomen  Consider changing metoprolol to Coreg which would help on portal hypertension.  Patient is to discuss with the his cardiology.  CBC CMP PT/INR in next 4-8 weeks  Follow-up in 3 months    12/10/2024  Recent labs with elevated liver enzymes. He reports no alcohol consumption, intravenous drug use, tattoos, or blood transfusions. CTAP 7/15/2024 with fatty liver and changes consistent with cirrhosis. NATHANIEL negative. HIV negative. PT/INR abnormal: INR 1.47. He is not immune to hepatitis A or hepatitis B.  He is negative for hepatitis B and hepatitis C.  NATHANIEL negative.  Previous iron level low.  Smooth muscle antibody normal.  Mitochondrial antibody normal.  Alpha-1 antitrypsin normal.  FibroSure with moderate HAWKINS N2/S2-S3/F1-F2.  Triglycerides 82.    Prior history  5. Adenomatous polyp of colon, unspecified part of colon  Colonoscopy dated 10/7/2024 with polyps removed, tubular adenomas without dysplasia.  No family history of colon cancer. Colonoscopy for surveillance in 3 years, October 2027.       Follow Up:   Return in about 3 months (around 8/13/2025).    Brittany Sandoval MD  [facility]  5/13/2025  17:53 EDT     Please note that portions of this  note may have been completed with a voice recognition program.

## 2025-06-11 ENCOUNTER — HOSPITAL ENCOUNTER (OUTPATIENT)
Dept: SLEEP MEDICINE | Facility: HOSPITAL | Age: 55
Discharge: HOME | End: 2025-06-11
Admitting: INTERNAL MEDICINE
Payer: COMMERCIAL

## 2025-06-11 DIAGNOSIS — G47.33 OBSTRUCTIVE SLEEP APNEA: ICD-10-CM

## 2025-06-11 PROCEDURE — 95811 POLYSOM 6/>YRS CPAP 4/> PARM: CPT

## 2025-06-16 ENCOUNTER — TELEPHONE (OUTPATIENT)
Dept: PULMONOLOGY | Facility: CLINIC | Age: 55
End: 2025-06-16
Payer: COMMERCIAL

## 2025-06-16 DIAGNOSIS — G47.33 OSA (OBSTRUCTIVE SLEEP APNEA): Primary | ICD-10-CM

## 2025-06-16 NOTE — TELEPHONE ENCOUNTER
Patient made aware of sleep study results. Will send order to [facility].. Follow up 9-4-25.

## 2025-06-18 DIAGNOSIS — K74.60 CIRRHOSIS OF LIVER WITH ASCITES, UNSPECIFIED HEPATIC CIRRHOSIS TYPE: ICD-10-CM

## 2025-06-18 DIAGNOSIS — R18.8 CIRRHOSIS OF LIVER WITH ASCITES, UNSPECIFIED HEPATIC CIRRHOSIS TYPE: ICD-10-CM

## 2025-06-18 DIAGNOSIS — I10 ESSENTIAL HYPERTENSION: ICD-10-CM

## 2025-06-18 DIAGNOSIS — I48.91 ATRIAL FIBRILLATION, UNSPECIFIED TYPE: ICD-10-CM

## 2025-06-18 RX ORDER — METOPROLOL SUCCINATE 200 MG/1
200 TABLET, EXTENDED RELEASE ORAL DAILY
Qty: 90 TABLET | Refills: 0 | Status: SHIPPED | OUTPATIENT
Start: 2025-06-18

## 2025-06-18 RX ORDER — LISINOPRIL 30 MG/1
30 TABLET ORAL DAILY
Qty: 90 TABLET | Refills: 1 | Status: SHIPPED | OUTPATIENT
Start: 2025-06-18

## 2025-06-18 RX ORDER — FUROSEMIDE 40 MG/1
40 TABLET ORAL 2 TIMES DAILY
Qty: 60 TABLET | Refills: 5 | Status: SHIPPED | OUTPATIENT
Start: 2025-06-18

## 2025-06-18 NOTE — TELEPHONE ENCOUNTER
After Visit Summary   3/21/2017    Luiz Ballesteros    MRN: [id number]           Patient Information     Date Of Birth          1966        Visit Information        Provider Department      3/21/2017 4:00 PM Noris Carrington OT [facility] Hand Therapy        Today's Diagnoses     CMC arthritis, thumb, degenerative        Thumb pain, left           Follow-ups after your visit        Your next 10 appointments already scheduled     Apr 20, 2017  2:20 PM CDT   (Arrive by 2:05 PM)   Return Visit with Raghav Faustin MD   [facility] Ear Nose and Throat ([facility])    [address]  4th [address]  [facility] [address]   [phone]            May 15, 2017  1:00 PM CDT   RETURN RETINA with Lindsey Perez MD   Eye Clinic ([facility])    [address]  [address] [facility]  [address] [address]  [facility] [address]   [phone]            Aug 10, 2017 10:00 AM CDT   (Arrive by 9:45 AM)   MR ORBIT FACE NECK W/O & W CONTRAST with [address] MRI ([facility])    [address]  1st [address]  [facility] [address] [phone]           Take your medicines as usual, unless your doctor tells you not to. Bring a list of your current medicines to your exam (including vitamins, minerals and over-the-counter drugs).  You will be given intravenous contrast for this exam. To prepare:   The day before your exam, drink extra fluids at least six 8-ounce glasses (unless your doctor tells you to restrict your fluids).   Have a blood test (creatinine test) within 30 days of your exam. Go to your clinic or Diagnostic Imaging Department for this test.  The MRI machine uses a strong magnet. Please wear clothes without metal (snaps, zippers). A sweatsuit works well, or we may give you a hospital gown.  Please remove any body piercings and hair extensions before you arrive. You will also remove  Rx Refill Note  Requested Prescriptions     Signed Prescriptions Disp Refills    metoprolol succinate XL (TOPROL-XL) 200 MG 24 hr tablet 90 tablet 0     Sig: Take 1 tablet by mouth Daily.     Authorizing Provider: CHRIS MARROQUIN     Ordering User: CARLENE SMITH    lisinopril (PRINIVIL,ZESTRIL) 30 MG tablet 90 tablet 1     Sig: Take 1 tablet by mouth Daily.     Authorizing Provider: CHRIS MARROQUIN     Ordering User: CARLENE SMITH    furosemide (LASIX) 40 MG tablet 60 tablet 5     Sig: Take 1 tablet by mouth 2 (Two) Times a Day.     Authorizing Provider: CHRIS MARROQUIN     Ordering User: CARLENE SMITH    apixaban (ELIQUIS) 5 MG tablet tablet 180 tablet 1     Sig: Take 1 tablet by mouth Every 12 (Twelve) Hours.     Authorizing Provider: CHRIS MARROQUIN User: CARLENE SMITH      Last office visit with prescribing clinician: 4/11/2025   Last telemedicine visit with prescribing clinician: Visit date not found   Next office visit with prescribing clinician: 7/15/2025                        Pharmacy Info    Last Fill Date:  Rx Written Date:   Prescribed Qty:   Additional Details from Pharmacy:    Patient passed protocols per sophie.         Carlene Smith MA  06/18/25, 12:30 EDT   watches, jewelry, hairpins, wallets, dentures, partial dental plates and hearing aids. You may wear contact lenses, and you may be able to wear your rings. We have a safe place to keep your personal items, but it is safer to leave them at home.   **IMPORTANT** THE INSTRUCTIONS BELOW ARE ONLY FOR THOSE PATIENTS WHO HAVE BEEN TOLD THEY WILL RECEIVE SEDATION OR GENERAL ANESTHESIA DURING THEIR MRI PROCEDURE:  IF YOU WILL RECEIVE SEDATION (take medicine to help you relax during your exam):   You must get the medicine from your doctor before you arrive. Bring the medicine to the exam. Do not take it at home.   Arrive one hour early. Bring someone who can take you home after the test. Your medicine will make you sleepy. After the exam, you may not drive, take a bus or take a taxi by yourself.   No eating 8 hours before your exam. You may have clear liquids up until 4 hours before your exam. (Clear liquids include water, clear tea, black coffee and fruit juice without pulp.)  IF YOU WILL RECEIVE ANESTHESIA (be asleep for your exam):   Arrive 1 1/2 hours early. Bring someone who can take you home after the test. You may not drive, take a bus or take a taxi by yourself.   No eating 8 hours before your exam. You may have clear liquids up until 4 hours before your exam. (Clear liquids include water, clear tea, black coffee and fruit juice without pulp.)  Please call the Imaging Department at your exam site with any questions.            Nov 07, 2017  9:45 AM CST   VISUAL FIELD with [facility] EYE VISUAL FIELD   Eye Clinic ([facility])    [address]  [address] [place] [address]   [phone]            Nov 07, 2017 10:15 AM CST   RETURN GLAUCOMA with Jennyfer Mccall MD   Eye Clinic ([facility])    [address]  [address] [facility]  [address] [address] [place] [address]   [phone]              Who to contact     If you have questions or need follow up  information about today's clinic visit or your schedule please contact [facility]ERAP[facility] directly at [phone].  Normal or non-critical lab and imaging results will be communicated to you by MyChart, letter or phone within 4 business days after the clinic has received the results. If you do not hear from us within 7 days, please contact the clinic through [employer] or phone. If you have a critical or abnormal lab result, we will notify you by phone as soon as possible.  Submit refill requests through [employer] or call your pharmacy and they will forward the refill request to us. Please allow 3 business days for your refill to be completed.          Additional Information About Your Visit        [employer] Information     [employer] gives you secure access to your electronic health record. If you see a primary care provider, you can also send messages to your care team and make appointments. If you have questions, please call your primary care clinic.  If you do not have a primary care provider, please call [phone] and they will assist you.        Care EveryWhere ID     This is your Care EveryWhere ID. This could be used by other organizations to access your [facility] medical records  [id number]         Blood Pressure from Last 3 Encounters:   12/27/16 128/77   11/16/16 119/60   10/24/16 108/72    Weight from Last 3 Encounters:   03/14/17 127 kg (280 lb)   02/09/17 127 kg (280 lb)   02/09/17 129.3 kg (285 lb)              We Performed the Following     SELF CARE MNGMENT TRAINING     THERAPEUTIC EXERCISES        Primary Care Provider Office Phone # Fax #    Christopher Gaytan [phone]       [facility] [address] [place] [id number]        Thank you!     Thank you for choosing [facility]  for your care. Our goal is always to provide you with excellent care. Hearing back from our patients is one way we can continue to improve our services. Please take a few  minutes to complete the written survey that you may receive in the mail after your visit with us. Thank you!             Your Updated Medication List - Protect others around you: Learn how to safely use, store and throw away your medicines at www.disposemymeds.org.          This list is accurate as of: 3/21/17  8:07 PM.  Always use your most recent med list.                   Brand Name Dispense Instructions for use    amLODIPine 10 MG tablet    NORVASC    30 tablet    Take 1 tablet (10 mg) by mouth daily       busPIRone 10 MG tablet    BUSPAR         latanoprost 0.005 % ophthalmic solution    XALATAN    1 Bottle    Place 1 drop into both eyes At Bedtime       lisinopril 40 MG tablet    PRINIVIL/ZESTRIL    90 tablet    Take 1 tablet (40 mg) by mouth daily       methylphenidate 10 MG tablet    RITALIN         ofloxacin 0.3 % ophthalmic solution    OCUFLOX         * ofloxacin 0.3 % otic solution    FLOXIN    10 mL    Instill into affected ear/s:  3gtts BID x 7 days       * ofloxacin 0.3 % otic solution    FLOXIN    10 mL    Place 3gtts into affected ear TID x 7 days, then BID x 7 days, then DAILY x 7 days, then stop       omeprazole 40 MG capsule    priLOSEC    90 capsule    Take 1 capsule (40 mg) by mouth daily       * order for DME      Use your CPAP device as directed by your provider.       * order for DME     2 Device    Equipment being ordered: double strap ankle brace- XL for right and left ankle       * order for DME     1 Device    Equipment being ordered: CMC brace, LEFT, XL, $28       * timolol 0.5 % ophthalmic solution    TIMOPTIC    1 Bottle    Place 1 drop into both eyes daily       * timolol 0.5 % ophthalmic gel-form    TIMOPTIC-XE    5 mL    Place 1 drop into both eyes daily       traZODone 50 MG tablet    DESYREL         * venlafaxine 150 MG 24 hr capsule    EFFEXOR-XR         * venlafaxine 75 MG 24 hr capsule    EFFEXOR-XR         * Notice:  This list has 9 medication(s) that are the same as other  medications prescribed for you. Read the directions carefully, and ask your doctor or other care provider to review them with you.

## 2025-07-09 ENCOUNTER — TELEPHONE (OUTPATIENT)
Dept: GASTROENTEROLOGY | Facility: CLINIC | Age: 55
End: 2025-07-09
Payer: COMMERCIAL

## 2025-07-09 NOTE — TELEPHONE ENCOUNTER
Spoke to patient regarding order for US Paracentesis - provider is out this week - provider returning 07-14-25 I will send message for order - I recommended to patient to go to [facility] ED if needed and to watch sodium intake and no alcohol.  He understands.

## 2025-07-10 ENCOUNTER — HOSPITAL ENCOUNTER (OUTPATIENT)
Facility: HOSPITAL | Age: 55
Discharge: HOME | End: 2025-07-10
Admitting: INTERNAL MEDICINE
Payer: COMMERCIAL

## 2025-07-10 VITALS — HEIGHT: 72 IN | WEIGHT: 250 LBS | BODY MASS INDEX: 33.86 KG/M2

## 2025-07-10 DIAGNOSIS — I10 BENIGN ESSENTIAL HYPERTENSION: ICD-10-CM

## 2025-07-10 DIAGNOSIS — I87.2 CHRONIC VENOUS INSUFFICIENCY: ICD-10-CM

## 2025-07-10 DIAGNOSIS — I48.0 PAROXYSMAL ATRIAL FIBRILLATION: ICD-10-CM

## 2025-07-10 LAB
AORTIC DIMENSIONLESS INDEX: 0.78 (DI)
ASCENDING AORTA: 3.6 CM
AV MEAN PRESS GRAD SYS DOP V1V2: 3 MMHG
AV VMAX SYS DOP: 113.5 CM/SEC
BH CV ECHO MEAS - AO MAX PG: 5.2 MMHG
BH CV ECHO MEAS - AO ROOT DIAM: 3.4 CM
BH CV ECHO MEAS - AO V2 VTI: 22 CM
BH CV ECHO MEAS - AVA(I,D): 2.7 CM2
BH CV ECHO MEAS - EDV(CUBED): 132.7 ML
BH CV ECHO MEAS - EDV(MOD-SP2): 78 ML
BH CV ECHO MEAS - EDV(MOD-SP4): 103 ML
BH CV ECHO MEAS - EF(MOD-SP2): 57.3 %
BH CV ECHO MEAS - EF(MOD-SP4): 54.5 %
BH CV ECHO MEAS - ESV(CUBED): 39.3 ML
BH CV ECHO MEAS - ESV(MOD-SP2): 33.3 ML
BH CV ECHO MEAS - ESV(MOD-SP4): 46.9 ML
BH CV ECHO MEAS - FS: 33.3 %
BH CV ECHO MEAS - IVS/LVPW: 0.8 CM
BH CV ECHO MEAS - IVSD: 0.8 CM
BH CV ECHO MEAS - LA DIMENSION: 4.8 CM
BH CV ECHO MEAS - LAT PEAK E' VEL: 12.3 CM/SEC
BH CV ECHO MEAS - LV DIASTOLIC VOL/BSA (35-75): 44.1 CM2
BH CV ECHO MEAS - LV MASS(C)D: 163.6 GRAMS
BH CV ECHO MEAS - LV MAX PG: 2.7 MMHG
BH CV ECHO MEAS - LV MEAN PG: 1 MMHG
BH CV ECHO MEAS - LV SYSTOLIC VOL/BSA (12-30): 20.1 CM2
BH CV ECHO MEAS - LV V1 MAX: 81.8 CM/SEC
BH CV ECHO MEAS - LV V1 VTI: 17.2 CM
BH CV ECHO MEAS - LVIDD: 5.1 CM
BH CV ECHO MEAS - LVIDS: 3.4 CM
BH CV ECHO MEAS - LVOT AREA: 3.5 CM2
BH CV ECHO MEAS - LVOT DIAM: 2.1 CM
BH CV ECHO MEAS - LVPWD: 1 CM
BH CV ECHO MEAS - MED PEAK E' VEL: 8.7 CM/SEC
BH CV ECHO MEAS - MV A MAX VEL: 27.3 CM/SEC
BH CV ECHO MEAS - MV DEC SLOPE: 423 CM/SEC2
BH CV ECHO MEAS - MV DEC TIME: 0.17 SEC
BH CV ECHO MEAS - MV E MAX VEL: 72.2 CM/SEC
BH CV ECHO MEAS - MV E/A: 2.6
BH CV ECHO MEAS - MV MAX PG: 5.4 MMHG
BH CV ECHO MEAS - MV MEAN PG: 1 MMHG
BH CV ECHO MEAS - MV V2 VTI: 28.3 CM
BH CV ECHO MEAS - MVA(VTI): 2.11 CM2
BH CV ECHO MEAS - PA ACC TIME: 0.14 SEC
BH CV ECHO MEAS - PA V2 MAX: 104 CM/SEC
BH CV ECHO MEAS - RAP SYSTOLE: 8 MMHG
BH CV ECHO MEAS - RVSP: 26 MMHG
BH CV ECHO MEAS - SV(LVOT): 59.6 ML
BH CV ECHO MEAS - SV(MOD-SP2): 44.7 ML
BH CV ECHO MEAS - SV(MOD-SP4): 56.1 ML
BH CV ECHO MEAS - SVI(LVOT): 25.5 ML/M2
BH CV ECHO MEAS - SVI(MOD-SP2): 19.2 ML/M2
BH CV ECHO MEAS - SVI(MOD-SP4): 24 ML/M2
BH CV ECHO MEAS - TAPSE (>1.6): 1.75 CM
BH CV ECHO MEAS - TR MAX PG: 18.3 MMHG
BH CV ECHO MEAS - TR MAX VEL: 214 CM/SEC
BH CV ECHO MEASUREMENTS AVERAGE E/E' RATIO: 6.88
BH CV VAS BP LEFT ARM: NORMAL MMHG
BH CV XLRA - RV BASE: 2.8 CM
BH CV XLRA - RV LENGTH: 8.1 CM
BH CV XLRA - RV MID: 2.6 CM
BH CV XLRA - TDI S': 10.8 CM/SEC
IVRT: 92 MS
LEFT ATRIUM VOLUME INDEX: 41.9 ML/M2
LV EF BIPLANE MOD: 55.4 %

## 2025-07-10 PROCEDURE — 93306 TTE W/DOPPLER COMPLETE: CPT

## 2025-07-12 ENCOUNTER — PREP FOR SURGERY (OUTPATIENT)
Dept: OTHER | Facility: HOSPITAL | Age: 55
End: 2025-07-12
Payer: COMMERCIAL

## 2025-07-12 DIAGNOSIS — K74.60 CIRRHOSIS OF LIVER WITH ASCITES, UNSPECIFIED HEPATIC CIRRHOSIS TYPE: Primary | ICD-10-CM

## 2025-07-12 DIAGNOSIS — R18.8 CIRRHOSIS OF LIVER WITH ASCITES, UNSPECIFIED HEPATIC CIRRHOSIS TYPE: Primary | ICD-10-CM

## 2025-07-12 RX ORDER — ALBUMIN (HUMAN) 12.5 G/50ML
25 SOLUTION INTRAVENOUS ONCE
OUTPATIENT
Start: 2025-07-12 | End: 2025-07-12

## 2025-07-14 PROBLEM — I48.21 PERMANENT ATRIAL FIBRILLATION: Status: ACTIVE | Noted: 2025-02-24

## 2025-07-14 PROBLEM — E78.5 HYPERLIPIDEMIA LDL GOAL <70: Status: ACTIVE | Noted: 2025-07-14

## 2025-07-14 NOTE — ASSESSMENT & PLAN NOTE
Continue wearing compression socks 20 to 30 mmHg preferably thigh-high if not knee-high to reduce pressure of the superficial veins causing discomfort and swelling, when not wearing compression socks will recommend keep legs elevated above the waist line on pillow or resurface to reduce the pressure of the lower extremity venous system.  Recommend doing regular exercise and weight reduction weight reduction that will decrease the pressure of the deep system and help in venous return that reduces swelling and edema in the lower extremities.

## 2025-07-14 NOTE — ASSESSMENT & PLAN NOTE
Lipid abnormalities are stable    Plan:  Continue same medication/s without change.      Discussed medication dosage, use, side effects, and goals of treatment in detail.    Counseled patient on lifestyle modifications to help control hyperlipidemia.   Advised patient to exercise for 150 minutes weekly. (30 minute brisk walk, 5 days a week for example)    Patient Treatment Goals:   LDL goal is less than 70    Followup in 3 months.  The patient is on atorvastatin 20 mg once a day, again I have discussed with the patient about going off atorvastatin due to liver failure.

## 2025-07-14 NOTE — PROGRESS NOTES
Cardiology Follow-Up Note     Name: Neal Lopez  :   1970  PCP: Emmy Aguilar APRN  Date:   2025  Department: [facility] [place] [facility] CARDIOLOGY  [address] [facility] [address]  [facility] [address]  Fax [phone]  Phone [phone]    Chief Complaint   Patient presents with    Atrial Fibrillation    Hypertension    Hyperlipidemia     Problem list:     Permanent atrial fibrillation  2024 echocardiogram EF 56% with severe left ventricular hypertrophy, mild mitral, tricuspid regurgitation underlying rhythm is atrial for.  2017 successful DC cardioversion to sinus rhythm.  Adult Transthoracic Echo Complete W/ Cont if Necessary Per Protocol (07/10/2025 09:34)   Hypertension benign essential  CKD stage II  Chronic venous insufficiency  2024 bilateral GSV and left SSV remain occluded following recent catheter-based intervention.  Hyperlipidemia  Cirrhosis of the liver with ascites    Subjective     History of Present Illness  Neal Lopez is a 55 y.o. male who presents today for routine follow-up of chronic conditions.  Patient has a past medical history of chronic venous insufficiency s/p ablation, hypertension, hyperlipidemia, CKD2, perm A-fib.  The patient was recently diagnosed with cirrhosis with ascites. Patient states has shortness of breath with chest xray showing fluids in lungs.          Current Outpatient Medications:     furosemide (LASIX) 40 MG tablet, Take 1 tablet by mouth 2 (Two) Times a Day., Disp: 60 tablet, Rfl: 5    spironolactone (ALDACTONE) 100 MG tablet, Take 1 tablet by mouth Daily., Disp: 90 tablet, Rfl: 1    Testosterone Cypionate (DEPOTESTOTERONE CYPIONATE) 200 MG/ML injection, Inject 1 mL into the appropriate muscle as directed by prescriber Every 14 (Fourteen) Days. (Patient taking differently: Inject 1 mL into the appropriate muscle as directed by prescriber 2 (Two) Times a Week.), Disp: , Rfl:     apixaban  "(ELIQUIS) 5 MG tablet tablet, Take 1 tablet by mouth Every 12 (Twelve) Hours. (Patient not taking: Reported on 7/15/2025), Disp: 180 tablet, Rfl: 1    atorvastatin (LIPITOR) 20 MG tablet, Take 1 tablet by mouth Daily. (Patient not taking: Reported on 7/15/2025), Disp: 90 tablet, Rfl: 1    Objective     Vital Signs:  /73 (BP Location: Left arm, Patient Position: Sitting)   Pulse 57   Ht 182.9 cm (72\")   Wt 116 kg (255 lb 14.4 oz)   BMI 34.71 kg/m²   Estimated body mass index is 34.71 kg/m² as calculated from the following:    Height as of this encounter: 182.9 cm (72\").    Weight as of this encounter: 116 kg (255 lb 14.4 oz).               Vitals reviewed.   Constitutional:       Appearance: Normal and healthy appearance.   Eyes:      Pupils: Pupils are equal, round, and reactive to light.   Pulmonary:      Effort: Pulmonary effort is normal.   Chest:      Chest wall: Not tender to palpatation.   Cardiovascular:      PMI at left midclavicular line. Normal rate. Regular rhythm.      No gallop.    Pulses:     Intact distal pulses.   Edema:     Peripheral edema absent.   Skin:     General: Skin is warm.   Psychiatric:         Behavior: Behavior is cooperative.              Data Review:   Lab Results   Component Value Date    GLUCOSE 90 04/24/2025    BUN 16 04/24/2025    CREATININE 1.19 04/24/2025    EGFRIFNONA 65 06/03/2020    EGFRIFAFRI 71 11/25/2024    BCR 13.4 04/24/2025    K 3.8 04/24/2025    CO2 27.7 04/24/2025    CALCIUM 8.7 04/24/2025    ALBUMIN 3.7 04/24/2025    AST 31 04/24/2025    ALT 12 04/24/2025     Lab Results   Component Value Date    CHOL 102 04/10/2025    TRIG 70 04/10/2025    HDL 19 (L) 04/10/2025    LDL 68 04/10/2025      Lab Results   Component Value Date    WBC 6.37 04/24/2025    RBC 5.39 04/24/2025    HGB 13.1 04/24/2025    HCT 41.8 04/24/2025    MCV 77.6 (L) 04/24/2025     04/24/2025     No results found for: \"TSH\"  No results found for: \"HGBA1C\"  Lab Results   Component Value " Date    INR 1.11 (H) 04/10/2025    INR 1.47 (H) 09/17/2024    INR 1.29 (H) 07/15/2024    PROTIME 14.9 04/10/2025    PROTIME 18.4 (H) 09/17/2024    PROTIME 16.7 (H) 07/15/2024       Labs 8/21/2024: GFR 66, LDL 73  No new labs available  Assessment and Plan     Assessment & Plan  Permanent atrial fibrillation  The patient has cirrhosis of the liver along with ascites, discussed with him the option of going off the Eliquis and going for Watchman procedure.  The patient understand fully the pros and cons.  Will refer patient to Dr.Karam Pablo MD per patient request.Patient Mingo is 1.  The patient requested Dr. Barney or  who has done his procedure last time.  I have notified Dr. Neal Barney about it.       Benign essential hypertension  Hypertension is stable and controlled  Continue current treatment regimen.  Dietary sodium restriction.  Weight loss.  Ambulatory blood pressure monitoring.  Blood pressure will be reassessed in 6 months.  Continue spironolactone 100 mg once a day.  Reviewed Brittany Caballero notes, for portal hypertension he would be better off on carvedilol, therefore will discontinue metoprolol and start carvedilol, patient can start at a half dose twice a day of carvedilol 25 mg twice daily and then if the blood pressure is above 130 go to 25 mg twice a day.       Chronic venous insufficiency  Continue wearing compression socks 20 to 30 mmHg preferably thigh-high if not knee-high to reduce pressure of the superficial veins causing discomfort and swelling, when not wearing compression socks will recommend keep legs elevated above the waist line on pillow or resurface to reduce the pressure of the lower extremity venous system.  Recommend doing regular exercise and weight reduction weight reduction that will decrease the pressure of the deep system and help in venous return that reduces swelling and edema in the lower extremities.        Hyperlipidemia LDL goal <70   Lipid  abnormalities are stable    Plan:  Continue same medication/s without change.      Discussed medication dosage, use, side effects, and goals of treatment in detail.    Counseled patient on lifestyle modifications to help control hyperlipidemia.   Advised patient to exercise for 150 minutes weekly. (30 minute brisk walk, 5 days a week for example)    Patient Treatment Goals:   LDL goal is less than 70    Followup in 3 months.  The patient is on atorvastatin 20 mg once a day, again I have discussed with the patient about going off atorvastatin due to liver failure.         Advised to continue current cardiac medications. Please notify of any issues. Discussed with the patient compliance with medical management and follow-up.     Follow Up  Return in about 3 months (around 10/15/2025).    Call if you have any significant symptoms or go to the [facility] Emergency room if possible.     Quentin Foster MD, FACC,[facility].  [facility]    Part of this note may be an electronic transcription/translation of spoken language to printed text using the Dragon Dictation System.

## 2025-07-14 NOTE — ASSESSMENT & PLAN NOTE
Hypertension is stable and controlled  Continue current treatment regimen.  Dietary sodium restriction.  Weight loss.  Ambulatory blood pressure monitoring.  Blood pressure will be reassessed in 6 months.  Continue spironolactone 100 mg once a day.  Reviewed Brittany Caballero notes, for portal hypertension he would be better off on carvedilol, therefore will discontinue metoprolol and start carvedilol, patient can start at a half dose twice a day of carvedilol 25 mg twice daily and then if the blood pressure is above 130 go to 25 mg twice a day.

## 2025-07-14 NOTE — ASSESSMENT & PLAN NOTE
The patient has cirrhosis of the liver along with ascites, discussed with him the option of going off the Eliquis and going for Watchman procedure.  The patient understand fully the pros and cons.  Will refer patient to Dr.Karam Pablo MD per patient request.Patient Mingo is 1.  The patient requested Dr. Barney or  who has done his procedure last time.  I have notified Dr. Neal Barney about it.

## 2025-07-15 ENCOUNTER — OFFICE VISIT (OUTPATIENT)
Age: 55
End: 2025-07-15
Payer: COMMERCIAL

## 2025-07-15 VITALS
HEART RATE: 57 BPM | HEIGHT: 72 IN | WEIGHT: 255.9 LBS | DIASTOLIC BLOOD PRESSURE: 73 MMHG | SYSTOLIC BLOOD PRESSURE: 103 MMHG | BODY MASS INDEX: 34.66 KG/M2

## 2025-07-15 DIAGNOSIS — I87.2 CHRONIC VENOUS INSUFFICIENCY: ICD-10-CM

## 2025-07-15 DIAGNOSIS — I10 BENIGN ESSENTIAL HYPERTENSION: ICD-10-CM

## 2025-07-15 DIAGNOSIS — E78.5 HYPERLIPIDEMIA LDL GOAL <70: ICD-10-CM

## 2025-07-15 DIAGNOSIS — I48.21 PERMANENT ATRIAL FIBRILLATION: Primary | ICD-10-CM

## 2025-07-15 RX ORDER — CARVEDILOL 25 MG/1
25 TABLET ORAL 2 TIMES DAILY
Qty: 180 TABLET | Refills: 1 | Status: SHIPPED | OUTPATIENT
Start: 2025-07-15

## 2025-07-21 ENCOUNTER — HOSPITAL ENCOUNTER (OUTPATIENT)
Dept: ULTRASOUND IMAGING | Facility: HOSPITAL | Age: 55
Discharge: HOME | End: 2025-07-21
Admitting: INTERNAL MEDICINE
Payer: COMMERCIAL

## 2025-07-21 VITALS
HEART RATE: 60 BPM | WEIGHT: 252 LBS | SYSTOLIC BLOOD PRESSURE: 113 MMHG | DIASTOLIC BLOOD PRESSURE: 73 MMHG | HEIGHT: 72 IN | BODY MASS INDEX: 34.13 KG/M2 | TEMPERATURE: 97.5 F | OXYGEN SATURATION: 97 % | RESPIRATION RATE: 16 BRPM

## 2025-07-21 DIAGNOSIS — R18.8 CIRRHOSIS OF LIVER WITH ASCITES, UNSPECIFIED HEPATIC CIRRHOSIS TYPE: ICD-10-CM

## 2025-07-21 DIAGNOSIS — K74.60 CIRRHOSIS OF LIVER WITH ASCITES, UNSPECIFIED HEPATIC CIRRHOSIS TYPE: ICD-10-CM

## 2025-07-21 LAB
BASOPHILS # BLD AUTO: 0.08 10*3/MM3 (ref 0–0.2)
BASOPHILS NFR BLD AUTO: 1 % (ref 0–1.5)
DEPRECATED RDW RBC AUTO: 51.7 FL (ref 37–54)
EOSINOPHIL # BLD AUTO: 0.3 10*3/MM3 (ref 0–0.4)
EOSINOPHIL NFR BLD AUTO: 3.7 % (ref 0.3–6.2)
ERYTHROCYTE [DISTWIDTH] IN BLOOD BY AUTOMATED COUNT: 18.2 % (ref 12.3–15.4)
HCT VFR BLD AUTO: 47.3 % (ref 37.5–51)
HGB BLD-MCNC: 14.8 G/DL (ref 13–17.7)
IMM GRANULOCYTES # BLD AUTO: 0.05 10*3/MM3 (ref 0–0.05)
IMM GRANULOCYTES NFR BLD AUTO: 0.6 % (ref 0–0.5)
INR PPP: 0.96 (ref 0.89–1.12)
LYMPHOCYTES # BLD AUTO: 1.95 10*3/MM3 (ref 0.7–3.1)
LYMPHOCYTES NFR BLD AUTO: 24.1 % (ref 19.6–45.3)
MCH RBC QN AUTO: 25.6 PG (ref 26.6–33)
MCHC RBC AUTO-ENTMCNC: 31.3 G/DL (ref 31.5–35.7)
MCV RBC AUTO: 81.8 FL (ref 79–97)
MONOCYTES # BLD AUTO: 0.8 10*3/MM3 (ref 0.1–0.9)
MONOCYTES NFR BLD AUTO: 9.9 % (ref 5–12)
NEUTROPHILS NFR BLD AUTO: 4.9 10*3/MM3 (ref 1.7–7)
NEUTROPHILS NFR BLD AUTO: 60.7 % (ref 42.7–76)
NRBC BLD AUTO-RTO: 0 /100 WBC (ref 0–0.2)
PLATELET # BLD AUTO: 284 10*3/MM3 (ref 140–450)
PMV BLD AUTO: 10.3 FL (ref 6–12)
PROTHROMBIN TIME: 13.4 SECONDS (ref 12.2–15.3)
RBC # BLD AUTO: 5.78 10*6/MM3 (ref 4.14–5.8)
WBC NRBC COR # BLD AUTO: 8.08 10*3/MM3 (ref 3.4–10.8)

## 2025-07-21 PROCEDURE — 76942 ECHO GUIDE FOR BIOPSY: CPT

## 2025-07-21 PROCEDURE — 85025 COMPLETE CBC W/AUTO DIFF WBC: CPT | Performed by: STUDENT IN AN ORGANIZED HEALTH CARE EDUCATION/TRAINING PROGRAM

## 2025-07-21 PROCEDURE — 85610 PROTHROMBIN TIME: CPT | Performed by: STUDENT IN AN ORGANIZED HEALTH CARE EDUCATION/TRAINING PROGRAM

## 2025-07-21 RX ORDER — SODIUM CHLORIDE 0.9 % (FLUSH) 0.9 %
10 SYRINGE (ML) INJECTION EVERY 12 HOURS SCHEDULED
Status: DISCONTINUED | OUTPATIENT
Start: 2025-07-21 | End: 2025-07-22 | Stop reason: HOSPADM

## 2025-07-21 RX ORDER — ALBUMIN (HUMAN) 12.5 G/50ML
25 SOLUTION INTRAVENOUS ONCE
Status: DISCONTINUED | OUTPATIENT
Start: 2025-07-21 | End: 2025-07-22 | Stop reason: HOSPADM

## 2025-07-21 RX ORDER — SODIUM CHLORIDE 9 MG/ML
40 INJECTION, SOLUTION INTRAVENOUS AS NEEDED
Status: DISCONTINUED | OUTPATIENT
Start: 2025-07-21 | End: 2025-07-22 | Stop reason: HOSPADM

## 2025-07-21 RX ORDER — SODIUM CHLORIDE 0.9 % (FLUSH) 0.9 %
10 SYRINGE (ML) INJECTION AS NEEDED
Status: DISCONTINUED | OUTPATIENT
Start: 2025-07-21 | End: 2025-07-22 | Stop reason: HOSPADM

## 2025-07-21 NOTE — OP NOTE
IR POST OP NOTE    Physician:Fredo Goode MD      Procedure: Aborted image guided paracentesis      Pre Op DX: Ascites      Post Op DX: Same      Anesthesia: Local anesthesia      Findings: No intra-abdominal fluid      Complications: None      Provider Signature: Fredo Goode MD    07/21/25  09:42 EDT

## 2025-07-21 NOTE — PRE-PROCEDURE NOTE
[facility]   Interventional Radiology H&P    Patient Name: Neal Lopez  : 1970  MRN: [id number]  Primary Care Physician:  Emmy Aguilar APRN  Referring Physician: Brittany Sandoval MD  Date of admission: 2025    Subjective   Subjective     HPI:  Neal Lopez is a 55 y.o. male with a history of recurrent ascites.  Patient presents to interventional radiology for image guided paracentesis.    Review of Systems:   Constitutional no fever,  no weight loss       Otolaryngeal no difficulty swallowing   Cardiovascular no chest pain   Pulmonary no cough, no sputum production   Gastrointestinal no constipation, no diarrhea                         Personal History       Past Medical/Surgical History:   Past Medical History:   Diagnosis Date    A-fib     Anemia 2024    Arthritis     Cervical disc disorder     Chronic kidney disease     Cirrhosis     Claustrophobia     Coronary artery disease     Edema     Elevated cholesterol     Esophageal varices     Fatty liver     Gout     Hyperlipidemia     Hypertension     Irregular heart rhythm     Liver disease     Low back pain     Lumbosacral disc disease     Mitral regurgitation     MILD BY ECHO    Morbid obesity     Peripheral neuropathy     PONV (postoperative nausea and vomiting)     Primary central sleep apnea     Pulmonary arterial hypertension     Rheumatoid arthritis     Sleep apnea     CPAP    Thoracic disc disorder     Tricuspid regurgitation     MILD BY ECHO     Past Surgical History:   Procedure Laterality Date    ABLATION OF DYSRHYTHMIC FOCUS  2024    CARDIAC ABLATION  10/03/2024    COLONOSCOPY N/A 10/07/2024    Procedure: Colonoscopy with biopsy and polypectomy and clipping;  Surgeon: Brittany Sandoval MD;  Location: [facility] ENDOSCOPY;  Service: Gastroenterology;  Laterality: N/A;    ENDOSCOPY N/A 2025    Procedure: Esophagogastroduodenoscopy with biopsy and polypectomy x1;  Surgeon: Brittany Sandoval  "MD;  Location: [facility] ENDOSCOPY;  Service: Gastroenterology;  Laterality: N/A;    OTHER SURGICAL HISTORY  12/14/2017    DCCV-SJE/JASMEET/SUCCESSFUL    OTHER SURGICAL HISTORY  04/10/2023    DCCV #2- SJE/LOPEZ/SUCCESSFUL DCCV    PARACENTESIS N/A 05/09/2025    THORACIC LAMINECTOMY  2020    Dr. Vital  - EMERGENT    TONSILLECTOMY         Social History:  reports that he has never smoked. He has never been exposed to tobacco smoke. He has never used smokeless tobacco. He reports that he does not drink alcohol and does not use drugs.    Medications:  (Not in a hospital admission)    Current medications:  albumin human, 25 g, Intravenous, Once  sodium chloride, 10 mL, Intravenous, Q12H      Current IV drips:       Allergies:  No Known Allergies    Objective    Objective     Vitals:   Temp:  [97.5 °F (36.4 °C)] 97.5 °F (36.4 °C)  Heart Rate:  [60] 60  Resp:  [16] 16  BP: (118)/(73) 118/73      Physical Exam:   Constitutional: Awake, alert, No acute distress    Respiratory: nonlabored respirations          Result Review        Result Review:     No results found for: \"NA\"    No results found for: \"K\"    No results found for: \"CL\"    No results found for: \"PLASMABICARB\"    No results found for: \"BUN\"    No results found for: \"CREATININE\"    No results found for: \"CALCIUM\"        No components found for: \"GLUCOSE.*\"  Results from last 7 days   Lab Units 07/21/25  0733   WBC 10*3/mm3 8.08   HEMOGLOBIN g/dL 14.8   HEMATOCRIT % 47.3   PLATELETS 10*3/mm3 284                Assessment / Plan     Assesment:  55-year-old male with a history of recurrent ascites.  Patient presents to interventional radiology for image guided paracentesis.      Plan:   Image guided paracentesis with local anesthesia only.    The risks and benefits of the procedure were discussed with the patient.    Electronically signed by Fredo Goode MD, 07/21/25, 8:45 AM EDT.    "

## 2025-07-23 ENCOUNTER — HOSPITAL ENCOUNTER (OUTPATIENT)
Dept: ULTRASOUND IMAGING | Facility: HOSPITAL | Age: 55
Discharge: HOME | End: 2025-07-23
Admitting: INTERNAL MEDICINE
Payer: COMMERCIAL

## 2025-07-23 DIAGNOSIS — R18.8 CIRRHOSIS OF LIVER WITH ASCITES, UNSPECIFIED HEPATIC CIRRHOSIS TYPE: ICD-10-CM

## 2025-07-23 DIAGNOSIS — K74.60 CIRRHOSIS OF LIVER WITH ASCITES, UNSPECIFIED HEPATIC CIRRHOSIS TYPE: ICD-10-CM

## 2025-07-23 PROCEDURE — 76700 US EXAM ABDOM COMPLETE: CPT

## 2025-07-24 ENCOUNTER — TELEPHONE (OUTPATIENT)
Dept: GASTROENTEROLOGY | Facility: CLINIC | Age: 55
End: 2025-07-24
Payer: COMMERCIAL

## 2025-07-31 ENCOUNTER — TELEPHONE (OUTPATIENT)
Age: 55
End: 2025-07-31
Payer: COMMERCIAL

## 2025-08-22 ENCOUNTER — LAB (OUTPATIENT)
Dept: LAB | Facility: HOSPITAL | Age: 55
End: 2025-08-22
Payer: COMMERCIAL

## 2025-08-27 ENCOUNTER — TELEPHONE (OUTPATIENT)
Dept: PULMONOLOGY | Facility: CLINIC | Age: 55
End: 2025-08-27
Payer: COMMERCIAL

## 2025-08-28 ENCOUNTER — LAB (OUTPATIENT)
Dept: LAB | Facility: HOSPITAL | Age: 55
End: 2025-08-28
Payer: COMMERCIAL

## 2025-08-28 ENCOUNTER — OFFICE VISIT (OUTPATIENT)
Dept: GASTROENTEROLOGY | Facility: CLINIC | Age: 55
End: 2025-08-28
Payer: COMMERCIAL

## 2025-08-28 VITALS
DIASTOLIC BLOOD PRESSURE: 84 MMHG | WEIGHT: 256 LBS | BODY MASS INDEX: 34.67 KG/M2 | OXYGEN SATURATION: 97 % | HEIGHT: 72 IN | TEMPERATURE: 97.7 F | HEART RATE: 67 BPM | SYSTOLIC BLOOD PRESSURE: 114 MMHG

## 2025-08-28 DIAGNOSIS — R18.8 CIRRHOSIS OF LIVER WITH ASCITES, UNSPECIFIED HEPATIC CIRRHOSIS TYPE: Primary | ICD-10-CM

## 2025-08-28 DIAGNOSIS — K59.00 CONSTIPATION, UNSPECIFIED CONSTIPATION TYPE: ICD-10-CM

## 2025-08-28 DIAGNOSIS — K74.60 CIRRHOSIS OF LIVER WITH ASCITES, UNSPECIFIED HEPATIC CIRRHOSIS TYPE: Primary | ICD-10-CM

## 2025-08-28 DIAGNOSIS — R18.8 CIRRHOSIS OF LIVER WITH ASCITES, UNSPECIFIED HEPATIC CIRRHOSIS TYPE: ICD-10-CM

## 2025-08-28 DIAGNOSIS — K74.60 CIRRHOSIS OF LIVER WITH ASCITES, UNSPECIFIED HEPATIC CIRRHOSIS TYPE: ICD-10-CM

## 2025-08-28 DIAGNOSIS — K21.9 GASTROESOPHAGEAL REFLUX DISEASE WITHOUT ESOPHAGITIS: ICD-10-CM

## 2025-08-28 LAB
ALBUMIN SERPL-MCNC: 4.6 G/DL (ref 3.5–5.2)
ALBUMIN/GLOB SERPL: 1.2 G/DL
ALP SERPL-CCNC: 135 U/L (ref 39–117)
ALT SERPL W P-5'-P-CCNC: 21 U/L (ref 1–41)
ANION GAP SERPL CALCULATED.3IONS-SCNC: 14 MMOL/L (ref 5–15)
AST SERPL-CCNC: 25 U/L (ref 1–40)
BASOPHILS # BLD AUTO: 0.09 10*3/MM3 (ref 0–0.2)
BASOPHILS NFR BLD AUTO: 1.2 % (ref 0–1.5)
BILIRUB SERPL-MCNC: 0.9 MG/DL (ref 0–1.2)
BUN SERPL-MCNC: 28 MG/DL (ref 6–20)
BUN/CREAT SERPL: 20.3 (ref 7–25)
CALCIUM SPEC-SCNC: 10.1 MG/DL (ref 8.6–10.5)
CHLORIDE SERPL-SCNC: 93 MMOL/L (ref 98–107)
CO2 SERPL-SCNC: 30 MMOL/L (ref 22–29)
CREAT SERPL-MCNC: 1.38 MG/DL (ref 0.76–1.27)
DEPRECATED RDW RBC AUTO: 45.1 FL (ref 37–54)
EGFRCR SERPLBLD CKD-EPI 2021: 59.6 ML/MIN/1.73
EOSINOPHIL # BLD AUTO: 0.3 10*3/MM3 (ref 0–0.4)
EOSINOPHIL NFR BLD AUTO: 4 % (ref 0.3–6.2)
ERYTHROCYTE [DISTWIDTH] IN BLOOD BY AUTOMATED COUNT: 14.8 % (ref 12.3–15.4)
GLOBULIN UR ELPH-MCNC: 3.7 GM/DL
GLUCOSE SERPL-MCNC: 103 MG/DL (ref 65–99)
HCT VFR BLD AUTO: 49.8 % (ref 37.5–51)
HGB BLD-MCNC: 16.1 G/DL (ref 13–17.7)
IMM GRANULOCYTES # BLD AUTO: 0.05 10*3/MM3 (ref 0–0.05)
IMM GRANULOCYTES NFR BLD AUTO: 0.7 % (ref 0–0.5)
INR PPP: 1.02 (ref 0.9–1.1)
LYMPHOCYTES # BLD AUTO: 1.97 10*3/MM3 (ref 0.7–3.1)
LYMPHOCYTES NFR BLD AUTO: 26.1 % (ref 19.6–45.3)
MCH RBC QN AUTO: 27.2 PG (ref 26.6–33)
MCHC RBC AUTO-ENTMCNC: 32.3 G/DL (ref 31.5–35.7)
MCV RBC AUTO: 84.3 FL (ref 79–97)
MONOCYTES # BLD AUTO: 0.73 10*3/MM3 (ref 0.1–0.9)
MONOCYTES NFR BLD AUTO: 9.7 % (ref 5–12)
NEUTROPHILS NFR BLD AUTO: 4.41 10*3/MM3 (ref 1.7–7)
NEUTROPHILS NFR BLD AUTO: 58.3 % (ref 42.7–76)
NRBC BLD AUTO-RTO: 0 /100 WBC (ref 0–0.2)
PLATELET # BLD AUTO: 297 10*3/MM3 (ref 140–450)
PMV BLD AUTO: 10.5 FL (ref 6–12)
POTASSIUM SERPL-SCNC: 5.3 MMOL/L (ref 3.5–5.2)
PROT SERPL-MCNC: 8.3 G/DL (ref 6–8.5)
PROTHROMBIN TIME: 14.1 SECONDS (ref 12.3–15.1)
RBC # BLD AUTO: 5.91 10*6/MM3 (ref 4.14–5.8)
SODIUM SERPL-SCNC: 137 MMOL/L (ref 136–145)
WBC NRBC COR # BLD AUTO: 7.55 10*3/MM3 (ref 3.4–10.8)

## 2025-08-28 PROCEDURE — 85610 PROTHROMBIN TIME: CPT

## 2025-08-28 PROCEDURE — 36415 COLL VENOUS BLD VENIPUNCTURE: CPT

## 2025-08-28 PROCEDURE — 85025 COMPLETE CBC W/AUTO DIFF WBC: CPT

## 2025-08-28 PROCEDURE — 1160F RVW MEDS BY RX/DR IN RCRD: CPT | Performed by: INTERNAL MEDICINE

## 2025-08-28 PROCEDURE — 99213 OFFICE O/P EST LOW 20 MIN: CPT | Performed by: INTERNAL MEDICINE

## 2025-08-28 PROCEDURE — 80053 COMPREHEN METABOLIC PANEL: CPT

## 2025-08-28 PROCEDURE — 3079F DIAST BP 80-89 MM HG: CPT | Performed by: INTERNAL MEDICINE

## 2025-08-28 PROCEDURE — 3074F SYST BP LT 130 MM HG: CPT | Performed by: INTERNAL MEDICINE

## 2025-08-28 PROCEDURE — 1159F MED LIST DOCD IN RCRD: CPT | Performed by: INTERNAL MEDICINE

## 2025-08-28 RX ORDER — LEFLUNOMIDE 20 MG/1
1 TABLET ORAL DAILY
COMMUNITY
Start: 2025-07-29

## 2025-08-28 RX ORDER — SILDENAFIL 100 MG/1
TABLET, FILM COATED ORAL AS NEEDED
COMMUNITY
Start: 2025-07-25

## 2025-08-28 RX ORDER — PREDNISONE 10 MG/1
TABLET ORAL DAILY PRN
COMMUNITY
Start: 2025-07-25

## (undated) DEVICE — Device

## (undated) DEVICE — HYBRID CO2 TUBING/CAP SET FOR OLYMPUS® SCOPES & CO2 SOURCE: Brand: ERBE

## (undated) DEVICE — VLV SXN AIR/H2O ORCAPOD3 1P/U STRL

## (undated) DEVICE — SINGLE-USE POLYPECTOMY SNARE: Brand: CAPTIVATOR II

## (undated) DEVICE — CONMED SCOPE SAVER BITE BLOCK, 20X27 MM: Brand: SCOPE SAVER

## (undated) DEVICE — ENDOSCOPY PORT CONNECTOR FOR OLYMPUS® SCOPES: Brand: ERBE

## (undated) DEVICE — LUBE JELLY PK/2.75GM STRL BX/144

## (undated) DEVICE — FRCP BX RADJAW4 NDL 2.8 240 STD OG

## (undated) DEVICE — QUICK CATCH IN-LINE SUCTION POLYP TRAP IS USED FOR SUCTION RETRIEVAL OF ENDOSCOPICALLY REMOVED POLYPS.